# Patient Record
Sex: FEMALE | Race: WHITE | NOT HISPANIC OR LATINO | Employment: FULL TIME | URBAN - METROPOLITAN AREA
[De-identification: names, ages, dates, MRNs, and addresses within clinical notes are randomized per-mention and may not be internally consistent; named-entity substitution may affect disease eponyms.]

---

## 2023-05-11 PROBLEM — E28.2 PCOS (POLYCYSTIC OVARIAN SYNDROME): Status: ACTIVE | Noted: 2023-05-11

## 2023-05-11 PROBLEM — E78.1 HYPERTRIGLYCERIDEMIA: Status: ACTIVE | Noted: 2023-05-11

## 2023-05-11 PROBLEM — M54.31 SCIATICA OF RIGHT SIDE: Status: ACTIVE | Noted: 2023-05-11

## 2023-05-11 PROBLEM — E11.9 DIET-CONTROLLED TYPE 2 DIABETES MELLITUS (MULTI): Status: ACTIVE | Noted: 2023-05-11

## 2023-05-11 PROBLEM — R10.2 PELVIC PAIN IN FEMALE: Status: ACTIVE | Noted: 2023-05-11

## 2023-05-11 PROBLEM — R29.898 WEAKNESS OF RIGHT LOWER EXTREMITY: Status: ACTIVE | Noted: 2023-05-11

## 2023-05-11 PROBLEM — M25.551 HIP PAIN, RIGHT: Status: ACTIVE | Noted: 2023-05-11

## 2023-05-11 PROBLEM — R92.8 ABNORMAL SCREENING MAMMOGRAM: Status: ACTIVE | Noted: 2023-05-11

## 2023-05-11 PROBLEM — S76.011S: Status: ACTIVE | Noted: 2023-05-11

## 2023-05-11 PROBLEM — M67.951 TENDINOPATHY OF RIGHT GLUTEUS MEDIUS: Status: ACTIVE | Noted: 2023-05-11

## 2023-05-11 PROBLEM — M16.11 PRIMARY OSTEOARTHRITIS OF RIGHT HIP: Status: ACTIVE | Noted: 2023-05-11

## 2023-05-11 PROBLEM — K13.0 PERLECHE: Status: ACTIVE | Noted: 2023-05-11

## 2023-05-11 PROBLEM — Z98.84 H/O BARIATRIC SURGERY: Status: ACTIVE | Noted: 2023-05-11

## 2023-05-11 PROBLEM — K21.9 GERD (GASTROESOPHAGEAL REFLUX DISEASE): Status: ACTIVE | Noted: 2023-05-11

## 2023-05-11 PROBLEM — E55.9 VITAMIN D DEFICIENCY: Status: ACTIVE | Noted: 2023-05-11

## 2023-05-11 RX ORDER — ASCORBIC ACID 500 MG
TABLET,CHEWABLE ORAL
COMMUNITY
Start: 2021-07-20

## 2023-05-11 RX ORDER — LORATADINE 10 MG/1
1 TABLET ORAL DAILY
COMMUNITY
Start: 2022-01-05

## 2023-05-11 RX ORDER — MULTIVITAMIN
2 TABLET ORAL DAILY
COMMUNITY
Start: 2021-07-20

## 2023-05-11 RX ORDER — CALCIUM CARBONATE 600 MG
1 TABLET ORAL DAILY
COMMUNITY
Start: 2021-07-20

## 2023-05-11 RX ORDER — GABAPENTIN 100 MG/1
1 CAPSULE ORAL 2 TIMES DAILY
COMMUNITY
Start: 2022-09-13 | End: 2023-10-06 | Stop reason: WASHOUT

## 2023-05-11 RX ORDER — FLUTICASONE PROPIONATE 50 MCG
SPRAY, SUSPENSION (ML) NASAL
COMMUNITY
Start: 2022-01-05

## 2023-05-11 RX ORDER — ACETAMINOPHEN 500 MG
TABLET ORAL
COMMUNITY
Start: 2021-07-20

## 2023-05-11 RX ORDER — ACETAMINOPHEN, DEXTROMETHORPHAN HBR, DOXYLAMINE SUCCINATE, PHENYLEPHRINE HCL 650; 20; 12.5; 1 MG/30ML; MG/30ML; MG/30ML; MG/30ML
SOLUTION ORAL
COMMUNITY
Start: 2021-07-20

## 2023-05-11 RX ORDER — NYSTATIN AND TRIAMCINOLONE ACETONIDE 100000; 1 [USP'U]/G; MG/G
CREAM TOPICAL 2 TIMES DAILY
COMMUNITY
Start: 2021-07-20 | End: 2023-07-28

## 2023-05-11 RX ORDER — NORGESTIMATE AND ETHINYL ESTRADIOL 7DAYSX3 LO
1 KIT ORAL DAILY
COMMUNITY
Start: 2022-02-02 | End: 2023-10-06 | Stop reason: WASHOUT

## 2023-05-11 RX ORDER — OMEPRAZOLE 40 MG/1
1 CAPSULE, DELAYED RELEASE ORAL DAILY
COMMUNITY
Start: 2021-08-06 | End: 2023-07-28 | Stop reason: DRUGHIGH

## 2023-05-11 RX ORDER — FLUTICASONE PROPIONATE 55 UG/1
POWDER, METERED RESPIRATORY (INHALATION)
COMMUNITY
End: 2024-02-02 | Stop reason: WASHOUT

## 2023-05-11 RX ORDER — BACLOFEN 20 MG
1 TABLET ORAL DAILY
COMMUNITY
Start: 2021-07-20

## 2023-05-12 ENCOUNTER — OFFICE VISIT (OUTPATIENT)
Dept: PRIMARY CARE | Facility: CLINIC | Age: 48
End: 2023-05-12
Payer: COMMERCIAL

## 2023-05-12 ENCOUNTER — TELEPHONE (OUTPATIENT)
Dept: PRIMARY CARE | Facility: CLINIC | Age: 48
End: 2023-05-12

## 2023-05-12 VITALS
SYSTOLIC BLOOD PRESSURE: 122 MMHG | RESPIRATION RATE: 18 BRPM | BODY MASS INDEX: 32.28 KG/M2 | WEIGHT: 213 LBS | HEIGHT: 68 IN | TEMPERATURE: 97.2 F | DIASTOLIC BLOOD PRESSURE: 85 MMHG

## 2023-05-12 DIAGNOSIS — Z23 NEED FOR SHINGLES VACCINE: Primary | ICD-10-CM

## 2023-05-12 DIAGNOSIS — N30.01 ACUTE CYSTITIS WITH HEMATURIA: ICD-10-CM

## 2023-05-12 DIAGNOSIS — N30.01 ACUTE CYSTITIS WITH HEMATURIA: Primary | ICD-10-CM

## 2023-05-12 DIAGNOSIS — R39.15 URINARY URGENCY: ICD-10-CM

## 2023-05-12 LAB
POC APPEARANCE, URINE: CLEAR
POC BILIRUBIN, URINE: NEGATIVE
POC BLOOD, URINE: ABNORMAL
POC COLOR, URINE: ABNORMAL
POC GLUCOSE, URINE: NEGATIVE MG/DL
POC KETONES, URINE: ABNORMAL MG/DL
POC LEUKOCYTES, URINE: ABNORMAL
POC NITRITE,URINE: NEGATIVE
POC PH, URINE: 7 PH
POC PROTEIN, URINE: NEGATIVE MG/DL
POC SPECIFIC GRAVITY, URINE: <=1.005
POC UROBILINOGEN, URINE: 0.2 EU/DL

## 2023-05-12 PROCEDURE — 87086 URINE CULTURE/COLONY COUNT: CPT

## 2023-05-12 PROCEDURE — 3079F DIAST BP 80-89 MM HG: CPT | Performed by: NURSE PRACTITIONER

## 2023-05-12 PROCEDURE — 90471 IMMUNIZATION ADMIN: CPT | Performed by: NURSE PRACTITIONER

## 2023-05-12 PROCEDURE — 90750 HZV VACC RECOMBINANT IM: CPT | Performed by: NURSE PRACTITIONER

## 2023-05-12 PROCEDURE — 81002 URINALYSIS NONAUTO W/O SCOPE: CPT | Performed by: NURSE PRACTITIONER

## 2023-05-12 PROCEDURE — 3074F SYST BP LT 130 MM HG: CPT | Performed by: NURSE PRACTITIONER

## 2023-05-12 PROCEDURE — 3044F HG A1C LEVEL LT 7.0%: CPT | Performed by: NURSE PRACTITIONER

## 2023-05-12 PROCEDURE — 87186 SC STD MICRODIL/AGAR DIL: CPT

## 2023-05-12 PROCEDURE — 99213 OFFICE O/P EST LOW 20 MIN: CPT | Performed by: NURSE PRACTITIONER

## 2023-05-12 PROCEDURE — 87077 CULTURE AEROBIC IDENTIFY: CPT

## 2023-05-12 PROCEDURE — 1036F TOBACCO NON-USER: CPT | Performed by: NURSE PRACTITIONER

## 2023-05-12 RX ORDER — NITROFURANTOIN 25; 75 MG/1; MG/1
100 CAPSULE ORAL 2 TIMES DAILY
Qty: 20 CAPSULE | Refills: 0 | Status: SHIPPED | OUTPATIENT
Start: 2023-05-12 | End: 2023-05-22

## 2023-05-12 RX ORDER — SULFAMETHOXAZOLE AND TRIMETHOPRIM 800; 160 MG/1; MG/1
1 TABLET ORAL 2 TIMES DAILY
Qty: 20 TABLET | Refills: 0 | Status: SHIPPED | OUTPATIENT
Start: 2023-05-12 | End: 2023-05-22

## 2023-05-12 RX ORDER — NITROFURANTOIN 25; 75 MG/1; MG/1
100 CAPSULE ORAL DAILY
Qty: 30 CAPSULE | Refills: 5 | Status: CANCELLED | OUTPATIENT
Start: 2023-05-12 | End: 2023-11-08

## 2023-05-12 ASSESSMENT — ENCOUNTER SYMPTOMS
WEAKNESS: 0
ACTIVITY CHANGE: 0
SHORTNESS OF BREATH: 0
SPEECH DIFFICULTY: 0
CHILLS: 0
APNEA: 0
WOUND: 0
PALPITATIONS: 0
SLEEP DISTURBANCE: 0
NERVOUS/ANXIOUS: 0
DYSURIA: 0
ABDOMINAL PAIN: 0
ARTHRALGIAS: 0
VOMITING: 0
FEVER: 0
HEADACHES: 0
COUGH: 0
NAUSEA: 0
FREQUENCY: 1
DIZZINESS: 0
CONSTIPATION: 0
CONFUSION: 0
SORE THROAT: 0
DIARRHEA: 0
CONSTITUTIONAL NEGATIVE: 1
MYALGIAS: 0
HEMATURIA: 0

## 2023-05-12 NOTE — TELEPHONE ENCOUNTER
Pharmacist called in about the Macrobid (Nitrofurantonin).  The pharmacist stated that the patient is allergic to red dye and this medication and all the generic has red dye in them.  The pharmacist is requesting an alternate medication if possible.

## 2023-05-12 NOTE — TELEPHONE ENCOUNTER
Called patient and left message that the medication was changed from Macrobid to Bactrim due to the red dye in the Macrobid.

## 2023-05-12 NOTE — TELEPHONE ENCOUNTER
Discontinue Macrobid and start Bactrim twice daily for 10 days  Please give patient an update that Macrobid contains red dye per pharmacy as well as all generics for med

## 2023-05-12 NOTE — PROGRESS NOTES
"Subjective   Patient ID: Riddhi Berumen is a 48 y.o. female who presents for Urinary Frequency (patient had UTI 3 weeks ago, still having symptoms ).    Patient complains of urinary frequency and discomfort   Patient started Keflex for 7 days seen by urgent care on 4/23  , Symptoms were present 3 days prior to that  And also developed Candida orally and vaginally.  She started Activia and had resolution  Today she is complaining of continued urinary frequency and discomfort    Request for shingles vaccine     Urinary Frequency   Associated symptoms include frequency. Pertinent negatives include no chills, hematuria, nausea or vomiting.        Review of Systems   Constitutional: Negative.  Negative for activity change, chills and fever.   HENT:  Negative for congestion, postnasal drip, sneezing and sore throat.    Respiratory:  Negative for apnea, cough and shortness of breath.    Cardiovascular:  Negative for chest pain and palpitations.   Gastrointestinal:  Negative for abdominal pain, constipation, diarrhea, nausea and vomiting.   Genitourinary:  Positive for frequency. Negative for dysuria, hematuria and pelvic pain.   Musculoskeletal:  Negative for arthralgias and myalgias.   Skin:  Negative for rash and wound.   Neurological:  Negative for dizziness, syncope, speech difficulty, weakness and headaches.   Psychiatric/Behavioral:  Negative for confusion and sleep disturbance. The patient is not nervous/anxious.        Objective   /85 (BP Location: Right arm, Patient Position: Sitting)   Temp 36.2 °C (97.2 °F)   Resp 18   Ht 1.727 m (5' 8\")   Wt 96.6 kg (213 lb)   BMI 32.39 kg/m²     Physical Exam  Vitals reviewed.   Constitutional:       Appearance: Normal appearance.   HENT:      Head: Normocephalic.   Eyes:      Conjunctiva/sclera: Conjunctivae normal.   Cardiovascular:      Rate and Rhythm: Normal rate and regular rhythm.      Pulses: Normal pulses.      Heart sounds: Normal heart sounds. "   Pulmonary:      Effort: Pulmonary effort is normal.      Breath sounds: Normal breath sounds. No wheezing.   Abdominal:      General: Bowel sounds are normal. There is no distension.   Skin:     General: Skin is warm and dry.   Neurological:      General: No focal deficit present.      Mental Status: She is alert and oriented to person, place, and time.   Psychiatric:         Mood and Affect: Mood normal.         Behavior: Behavior normal.         Assessment/Plan   Problem List Items Addressed This Visit          Genitourinary    Acute cystitis with hematuria    Relevant Medications    nitrofurantoin, macrocrystal-monohydrate, (Macrobid) 100 mg capsule    Other Relevant Orders    Urine Culture       Other    Need for shingles vaccine - Primary    Relevant Orders    Zoster vaccine, recombinant, adult (SHINGRIX) (Completed)     Other Visit Diagnoses       Urinary urgency        Relevant Orders    POCT UA (nonautomated) manually resulted (Completed)    Urine Culture            Time Spent  Time spent directly with patient, family or caregiver: 20 minutes  Documentation Time: 10 minutes

## 2023-05-15 LAB
URINE CULTURE: ABNORMAL
URINE CULTURE: ABNORMAL

## 2023-05-18 ENCOUNTER — TELEPHONE (OUTPATIENT)
Dept: PRIMARY CARE | Facility: CLINIC | Age: 48
End: 2023-05-18

## 2023-05-18 NOTE — TELEPHONE ENCOUNTER
Patient called in and would like to know are you willing to take her daughter on as a new patient?

## 2023-07-24 LAB
ALANINE AMINOTRANSFERASE (SGPT) (U/L) IN SER/PLAS: 15 U/L (ref 7–45)
ALBUMIN (G/DL) IN SER/PLAS: 3.7 G/DL (ref 3.4–5)
ALKALINE PHOSPHATASE (U/L) IN SER/PLAS: 66 U/L (ref 33–110)
ANION GAP IN SER/PLAS: 12 MMOL/L (ref 10–20)
ASPARTATE AMINOTRANSFERASE (SGOT) (U/L) IN SER/PLAS: 21 U/L (ref 9–39)
BILIRUBIN TOTAL (MG/DL) IN SER/PLAS: 0.3 MG/DL (ref 0–1.2)
CALCIDIOL (25 OH VITAMIN D3) (NG/ML) IN SER/PLAS: 60 NG/ML
CALCIUM (MG/DL) IN SER/PLAS: 9 MG/DL (ref 8.6–10.3)
CARBON DIOXIDE, TOTAL (MMOL/L) IN SER/PLAS: 27 MMOL/L (ref 21–32)
CHLORIDE (MMOL/L) IN SER/PLAS: 102 MMOL/L (ref 98–107)
CHOLESTEROL (MG/DL) IN SER/PLAS: 201 MG/DL (ref 0–199)
CHOLESTEROL IN HDL (MG/DL) IN SER/PLAS: 81.5 MG/DL
CHOLESTEROL IN LDL (MG/DL) IN SER/PLAS BY DIRECT ASSAY: 109 MG/DL (ref 0–129)
CHOLESTEROL/HDL RATIO: 2.5
CREATININE (MG/DL) IN SER/PLAS: 0.71 MG/DL (ref 0.5–1.05)
ESTIMATED AVERAGE GLUCOSE FOR HBA1C: 126 MG/DL
GFR FEMALE: >90 ML/MIN/1.73M2
GLUCOSE (MG/DL) IN SER/PLAS: 82 MG/DL (ref 74–99)
HEMOGLOBIN A1C/HEMOGLOBIN TOTAL IN BLOOD: 6 %
LDL: 80 MG/DL (ref 0–99)
POTASSIUM (MMOL/L) IN SER/PLAS: 4.1 MMOL/L (ref 3.5–5.3)
PROTEIN TOTAL: 6.8 G/DL (ref 6.4–8.2)
SODIUM (MMOL/L) IN SER/PLAS: 137 MMOL/L (ref 136–145)
TRIGLYCERIDE (MG/DL) IN SER/PLAS: 198 MG/DL (ref 0–149)
UREA NITROGEN (MG/DL) IN SER/PLAS: 13 MG/DL (ref 6–23)
VLDL: 40 MG/DL (ref 0–40)

## 2023-07-25 PROBLEM — M26.609 TEMPOROMANDIBULAR JOINT DISORDER: Status: ACTIVE | Noted: 2023-07-25

## 2023-07-25 PROBLEM — S76.011S: Status: RESOLVED | Noted: 2023-05-11 | Resolved: 2023-07-25

## 2023-07-25 PROBLEM — E60 ZINC DEFICIENCY: Status: ACTIVE | Noted: 2017-05-19

## 2023-07-25 PROBLEM — Z23 NEED FOR SHINGLES VACCINE: Status: RESOLVED | Noted: 2023-05-12 | Resolved: 2023-07-25

## 2023-07-25 PROBLEM — H90.3 SENSORINEURAL HEARING LOSS, BILATERAL: Status: ACTIVE | Noted: 2021-11-22

## 2023-07-25 PROBLEM — M25.551 HIP PAIN, RIGHT: Status: RESOLVED | Noted: 2023-05-11 | Resolved: 2023-07-25

## 2023-07-25 PROBLEM — R92.8 ABNORMAL SCREENING MAMMOGRAM: Status: RESOLVED | Noted: 2023-05-11 | Resolved: 2023-07-25

## 2023-07-25 PROBLEM — Z00.00 ANNUAL PHYSICAL EXAM: Status: ACTIVE | Noted: 2023-07-25

## 2023-07-25 PROBLEM — J30.2 SEASONAL ALLERGIC RHINITIS: Status: ACTIVE | Noted: 2022-01-05

## 2023-07-25 PROBLEM — K13.0 PERLECHE: Status: RESOLVED | Noted: 2023-05-11 | Resolved: 2023-07-25

## 2023-07-25 PROBLEM — N30.01 ACUTE CYSTITIS WITH HEMATURIA: Status: RESOLVED | Noted: 2023-05-12 | Resolved: 2023-07-25

## 2023-07-25 PROBLEM — R29.898 WEAKNESS OF RIGHT LOWER EXTREMITY: Status: RESOLVED | Noted: 2023-05-11 | Resolved: 2023-07-25

## 2023-07-25 PROBLEM — H90.2 CONDUCTIVE HEARING LOSS: Status: ACTIVE | Noted: 2023-07-25

## 2023-07-25 RX ORDER — AZELASTINE 1 MG/ML
SPRAY, METERED NASAL
COMMUNITY

## 2023-07-25 RX ORDER — LISINOPRIL AND HYDROCHLOROTHIAZIDE 10; 12.5 MG/1; MG/1
1 TABLET ORAL DAILY
COMMUNITY
End: 2024-02-02 | Stop reason: WASHOUT

## 2023-07-25 RX ORDER — METFORMIN HYDROCHLORIDE 500 MG/1
500 TABLET ORAL
COMMUNITY
End: 2023-10-06 | Stop reason: WASHOUT

## 2023-07-25 RX ORDER — MOMETASONE FUROATE 50 UG/1
SPRAY, METERED NASAL
COMMUNITY
End: 2024-02-02 | Stop reason: WASHOUT

## 2023-07-25 RX ORDER — ASPIRIN 81 MG/1
81 TABLET ORAL DAILY
COMMUNITY
End: 2023-10-06 | Stop reason: WASHOUT

## 2023-07-25 RX ORDER — DESOGESTREL AND ETHINYL ESTRADIOL 21-5 (28)
KIT ORAL EVERY 24 HOURS
COMMUNITY
End: 2024-02-02 | Stop reason: WASHOUT

## 2023-07-25 RX ORDER — MELOXICAM 7.5 MG/1
TABLET ORAL
COMMUNITY
Start: 2014-08-14 | End: 2024-02-02 | Stop reason: WASHOUT

## 2023-07-27 PROBLEM — E66.811 CLASS 1 OBESITY DUE TO EXCESS CALORIES WITH SERIOUS COMORBIDITY AND BODY MASS INDEX (BMI) OF 32.0 TO 32.9 IN ADULT: Status: ACTIVE | Noted: 2023-07-27

## 2023-07-27 PROBLEM — E66.09 CLASS 1 OBESITY DUE TO EXCESS CALORIES WITH SERIOUS COMORBIDITY AND BODY MASS INDEX (BMI) OF 32.0 TO 32.9 IN ADULT: Status: ACTIVE | Noted: 2023-07-27

## 2023-07-27 NOTE — ASSESSMENT & PLAN NOTE
Yearly physical done.  PAP 7/22  Has received both initial COVID vaccines and at least one booster. Bivalent vaccine recommended.  Shingrix #2 given Risks, benefits and side effects reviewed with patient.   Boostrix given Risks, benefits and side effects reviewed with patient.   Colonoscopy ordered  Mammogram ordered per gyn - due 9/23 per patient  Nonsmoker

## 2023-07-27 NOTE — PROGRESS NOTES
tetSubjective   Patient ID: Riddhi Berumen is a 48 y.o. female who presents for Annual Exam.    HPI  Patient presents today for Annual Physical.  Ongoing cracking at corners of lips despite nystatin cream.  Would like to try decreasing dose PPI.  Patient otherwise feels well. No other complaints or concerns.    The patient's relevant past medical, surgical, family, and social history was reviewed in [facility].  All pertinent lab work and results for this visit were reviewed with patient.    Recent Results (from the past 1008 hour(s))   Cholesterol, LDL Direct    Collection Time: 07/24/23  7:40 AM   Result Value Ref Range    LDL Direct 109 0 - 129 mg/dL   Lipid Panel    Collection Time: 07/24/23  7:40 AM   Result Value Ref Range    Cholesterol 201 (H) 0 - 199 mg/dL    HDL 81.5 mg/dL    Cholesterol/HDL Ratio 2.5     LDL 80 0 - 99 mg/dL    VLDL 40 0 - 40 mg/dL    Triglycerides 198 (H) 0 - 149 mg/dL   Vitamin D, Total    Collection Time: 07/24/23  7:40 AM   Result Value Ref Range    Vitamin D, 25-Hydroxy 60 ng/mL   Hemoglobin A1C    Collection Time: 07/24/23  7:40 AM   Result Value Ref Range    Hemoglobin A1C 6.0 (A) %    Estimated Average Glucose 126 MG/DL   Comprehensive Metabolic Panel    Collection Time: 07/24/23  7:40 AM   Result Value Ref Range    Glucose 82 74 - 99 mg/dL    Sodium 137 136 - 145 mmol/L    Potassium 4.1 3.5 - 5.3 mmol/L    Chloride 102 98 - 107 mmol/L    Bicarbonate 27 21 - 32 mmol/L    Anion Gap 12 10 - 20 mmol/L    Urea Nitrogen 13 6 - 23 mg/dL    Creatinine 0.71 0.50 - 1.05 mg/dL    GFR Female >90 >90 mL/min/1.73m2    Calcium 9.0 8.6 - 10.3 mg/dL    Albumin 3.7 3.4 - 5.0 g/dL    Alkaline Phosphatase 66 33 - 110 U/L    Total Protein 6.8 6.4 - 8.2 g/dL    AST 21 9 - 39 U/L    Total Bilirubin 0.3 0.0 - 1.2 mg/dL    ALT (SGPT) 15 7 - 45 U/L           Review of Systems   A complete review of systems was performed and all systems were normal except what is noted in the HPI.        Objective   /87    "Pulse 88   Temp 36.3 °C (97.3 °F)   Ht 1.727 m (5' 8\")   Wt 98.5 kg (217 lb 3.2 oz)   SpO2 98%   BMI 33.03 kg/m²    Physical Exam  Constitutional:       General: She is not in acute distress.     Appearance: Normal appearance. She is obese.   HENT:      Head: Normocephalic and atraumatic.   Cardiovascular:      Rate and Rhythm: Normal rate and regular rhythm.      Heart sounds: Normal heart sounds.   Pulmonary:      Effort: Pulmonary effort is normal.      Breath sounds: Normal breath sounds.   Abdominal:      General: Abdomen is flat. Bowel sounds are normal.      Palpations: Abdomen is soft.      Tenderness: There is no abdominal tenderness.   Musculoskeletal:      Right lower leg: No edema.      Left lower leg: No edema.   Neurological:      Mental Status: She is alert.   Psychiatric:         Mood and Affect: Mood normal.         Behavior: Behavior normal.         Thought Content: Thought content normal.         Health Maintenance Due   Topic Date Due    Yearly Adult Physical  Never done    Hepatitis B Vaccines (1 of 3 - 3-dose series) Never done    HIV Screening  Never done    Colorectal Cancer Screening  Never done    Pneumococcal Vaccine: Pediatrics (0 to 5 Years) and At-Risk Patients (6 to 64 Years) (1 - PCV) Never done    Diabetes: Foot Exam  Never done    Diabetes: Retinopathy Screening  Never done    Hepatitis C Screening  Never done    Cervical Cancer Screening  Never done    DTaP/Tdap/Td Vaccines (1 - Tdap) Never done    MMR Vaccines (1 of 1 - Standard series) Never done    COVID-19 Vaccine (4 - Booster for Pfizer series) 01/02/2023    Mammogram  03/08/2023    Zoster Vaccines (2 of 2) 07/07/2023        Assessment/Plan   Problem List Items Addressed This Visit       IFG (impaired fasting glucose)     Stable  continue work on diet  Reevaluate in 6 months.           GERD (gastroesophageal reflux disease)     Trial decrease omeprazole to 20 mg  Call if breakthrough symptoms          Relevant Medications "    omeprazole (PriLOSEC) 20 mg DR capsule    H/O bariatric surgery    Relevant Orders    TSH with reflex to Free T4 if abnormal    Ferritin    Folate    Iron and TIBC    Magnesium    Vitamin B12    Vitamin B6    Zinc, Serum or Plasma    Hypertriglyceridemia     Up some  Work on diet reviewed with patient.   Reevaluate in 6 months.           Relevant Orders    TSH with reflex to Free T4 if abnormal    Lipid Panel    Cholesterol, LDL Direct    Perleche     Stop nystatin/triamcinolone cream  Trial vytone  Avoid licking lips as able  call if worsens or persists          Relevant Medications    hydrocortisone-iodoquinoL-aloe 1.9-1 % cream in packet    Vitamin D deficiency     Well controlled continue current medication. Reevaluate in 6 months.            Relevant Orders    Vitamin D 1,25 Dihydroxy    Essential hypertension     Well controlled continue current medication. Reevaluate in 6 months.            Relevant Orders    TSH with reflex to Free T4 if abnormal    CBC    Comprehensive Metabolic Panel    Zinc deficiency    Relevant Orders    Zinc, Serum or Plasma    Annual physical exam - Primary     Yearly physical done.  PAP 7/22  Has received both initial COVID vaccines and at least one booster. Bivalent vaccine recommended.  Shingrix #2 given Risks, benefits and side effects reviewed with patient.   Boostrix given Risks, benefits and side effects reviewed with patient.   Colonoscopy ordered  Mammogram ordered per gyn - due 9/23 per patient  Nonsmoker         Class 1 obesity due to excess calories with serious comorbidity and body mass index (BMI) of 33.0 to 33.9 in adult     Work on diet reviewed with patient.   Recommend at least 150 minutes of cardiovascular exercise weekly.           Other Visit Diagnoses       Encounter for hepatitis C screening test for low risk patient        Relevant Orders    Hepatitis C Antibody    Encounter for screening for malignant neoplasm of colon        Relevant Orders    Colonoscopy               Patient understands and agrees with care plan.           Enma Agee MD

## 2023-07-27 NOTE — PATIENT INSTRUCTIONS
I would like you to follow up in 6 months  Please have all labs that were ordered done at least 1 week prior to your visit.    Recommend healthy diet based around fruits, vegetables, and lean proteins such as chicken, turkey, fish, and beans.  Also include moderate portions of healthy carbohydrates such as wheat bread and pasta, sweet potatoes. Limit sweets and alcoholic beverages. Try not drink more than 100 calories in beverages daily.   It is important to get a protein-rich breakfast daily such as oatmeal, eggs or Greek yogurt.  Increase activity as able to a recommended goal of at least 30 minutes of cardiovascular exercise (walking, swimming, biking, jogging etc.) at least 5 days weekly and a goal of 45 minutes or more most days of the week for weight loss. This exercise can be done all at one time or broken up into 2 or more sessions throughout the day.

## 2023-07-28 ENCOUNTER — OFFICE VISIT (OUTPATIENT)
Dept: PRIMARY CARE | Facility: CLINIC | Age: 48
End: 2023-07-28
Payer: COMMERCIAL

## 2023-07-28 ENCOUNTER — TELEPHONE (OUTPATIENT)
Dept: PRIMARY CARE | Facility: CLINIC | Age: 48
End: 2023-07-28

## 2023-07-28 VITALS
DIASTOLIC BLOOD PRESSURE: 87 MMHG | SYSTOLIC BLOOD PRESSURE: 130 MMHG | HEART RATE: 88 BPM | TEMPERATURE: 97.3 F | WEIGHT: 217.2 LBS | OXYGEN SATURATION: 98 % | HEIGHT: 68 IN | BODY MASS INDEX: 32.92 KG/M2

## 2023-07-28 DIAGNOSIS — K21.9 GASTROESOPHAGEAL REFLUX DISEASE WITHOUT ESOPHAGITIS: ICD-10-CM

## 2023-07-28 DIAGNOSIS — R73.01 IFG (IMPAIRED FASTING GLUCOSE): ICD-10-CM

## 2023-07-28 DIAGNOSIS — Z98.84 H/O BARIATRIC SURGERY: ICD-10-CM

## 2023-07-28 DIAGNOSIS — E60 ZINC DEFICIENCY: ICD-10-CM

## 2023-07-28 DIAGNOSIS — K13.0 PERLECHE: ICD-10-CM

## 2023-07-28 DIAGNOSIS — E66.09 CLASS 1 OBESITY DUE TO EXCESS CALORIES WITH SERIOUS COMORBIDITY AND BODY MASS INDEX (BMI) OF 33.0 TO 33.9 IN ADULT: ICD-10-CM

## 2023-07-28 DIAGNOSIS — E55.9 VITAMIN D DEFICIENCY: ICD-10-CM

## 2023-07-28 DIAGNOSIS — I10 ESSENTIAL HYPERTENSION: ICD-10-CM

## 2023-07-28 DIAGNOSIS — Z12.11 ENCOUNTER FOR SCREENING FOR MALIGNANT NEOPLASM OF COLON: ICD-10-CM

## 2023-07-28 DIAGNOSIS — E78.1 HYPERTRIGLYCERIDEMIA: ICD-10-CM

## 2023-07-28 DIAGNOSIS — Z00.00 ANNUAL PHYSICAL EXAM: Primary | ICD-10-CM

## 2023-07-28 DIAGNOSIS — Z11.59 ENCOUNTER FOR HEPATITIS C SCREENING TEST FOR LOW RISK PATIENT: ICD-10-CM

## 2023-07-28 DIAGNOSIS — E11.9 DIET-CONTROLLED TYPE 2 DIABETES MELLITUS (MULTI): ICD-10-CM

## 2023-07-28 PROCEDURE — 90472 IMMUNIZATION ADMIN EACH ADD: CPT | Performed by: FAMILY MEDICINE

## 2023-07-28 PROCEDURE — 99214 OFFICE O/P EST MOD 30 MIN: CPT | Performed by: FAMILY MEDICINE

## 2023-07-28 PROCEDURE — 90750 HZV VACC RECOMBINANT IM: CPT | Performed by: FAMILY MEDICINE

## 2023-07-28 PROCEDURE — 3044F HG A1C LEVEL LT 7.0%: CPT | Performed by: FAMILY MEDICINE

## 2023-07-28 PROCEDURE — 1036F TOBACCO NON-USER: CPT | Performed by: FAMILY MEDICINE

## 2023-07-28 PROCEDURE — 90471 IMMUNIZATION ADMIN: CPT | Performed by: FAMILY MEDICINE

## 2023-07-28 PROCEDURE — 90715 TDAP VACCINE 7 YRS/> IM: CPT | Performed by: FAMILY MEDICINE

## 2023-07-28 PROCEDURE — 3075F SYST BP GE 130 - 139MM HG: CPT | Performed by: FAMILY MEDICINE

## 2023-07-28 PROCEDURE — 3079F DIAST BP 80-89 MM HG: CPT | Performed by: FAMILY MEDICINE

## 2023-07-28 PROCEDURE — 99396 PREV VISIT EST AGE 40-64: CPT | Performed by: FAMILY MEDICINE

## 2023-07-28 PROCEDURE — 3008F BODY MASS INDEX DOCD: CPT | Performed by: FAMILY MEDICINE

## 2023-07-28 RX ORDER — HYDROCORTISONE, IODOQUINOL 10; 10 MG/G; MG/G
CREAM TOPICAL
Qty: 28.4 G | Refills: 2 | Status: SHIPPED | OUTPATIENT
Start: 2023-07-28 | End: 2024-02-02 | Stop reason: WASHOUT

## 2023-07-28 RX ORDER — HYDROCORTISONE ACETATE, IODOQUINOL 19; 10 MG/G; MG/G
CREAM TOPICAL
Qty: 15 G | Refills: 3 | Status: SHIPPED | OUTPATIENT
Start: 2023-07-28

## 2023-07-28 RX ORDER — OMEPRAZOLE 20 MG/1
20 CAPSULE, DELAYED RELEASE ORAL DAILY
Qty: 30 CAPSULE | Refills: 11 | Status: SHIPPED | OUTPATIENT
Start: 2023-07-28 | End: 2024-07-27

## 2023-07-28 ASSESSMENT — PATIENT HEALTH QUESTIONNAIRE - PHQ9
SUM OF ALL RESPONSES TO PHQ9 QUESTIONS 1 AND 2: 0
1. LITTLE INTEREST OR PLEASURE IN DOING THINGS: NOT AT ALL
2. FEELING DOWN, DEPRESSED OR HOPELESS: NOT AT ALL

## 2023-07-28 NOTE — ASSESSMENT & PLAN NOTE
Stop nystatin/triamcinolone cream  Trial vytone  Avoid licking lips as able  call if worsens or persists

## 2023-07-28 NOTE — LETTER
July 28, 2023     Patient: Riddhi Berumen   YOB: 1975   Date of Visit: 7/28/2023       To Whom It May Concern:    Riddhi Berumen was seen in my clinic on 7/28/2023 at 10:20 am for a physical examination.  If you have any questions or concerns, please don't hesitate to call.         Sincerely,         Enma Agee MD        CC: No Recipients

## 2023-07-28 NOTE — TELEPHONE ENCOUNTER
hydrocortisone-iodoquinoL-aloe 1.9-1 % cream in packet Is not covered by  her insurance. Please order the Hydrocortisone !5 and the Iodoquil 1% which is covered by her isnsurance

## 2023-09-18 ENCOUNTER — HOSPITAL ENCOUNTER (OUTPATIENT)
Dept: DATA CONVERSION | Facility: HOSPITAL | Age: 48
End: 2023-09-18
Attending: INTERNAL MEDICINE | Admitting: INTERNAL MEDICINE
Payer: COMMERCIAL

## 2023-09-18 DIAGNOSIS — M19.90 UNSPECIFIED OSTEOARTHRITIS, UNSPECIFIED SITE: ICD-10-CM

## 2023-09-18 DIAGNOSIS — Z80.0 FAMILY HISTORY OF MALIGNANT NEOPLASM OF DIGESTIVE ORGANS: ICD-10-CM

## 2023-09-18 DIAGNOSIS — K21.9 GASTRO-ESOPHAGEAL REFLUX DISEASE WITHOUT ESOPHAGITIS: ICD-10-CM

## 2023-09-18 DIAGNOSIS — Z12.11 ENCOUNTER FOR SCREENING FOR MALIGNANT NEOPLASM OF COLON: ICD-10-CM

## 2023-09-18 DIAGNOSIS — E11.9 TYPE 2 DIABETES MELLITUS WITHOUT COMPLICATIONS (MULTI): ICD-10-CM

## 2023-09-18 DIAGNOSIS — E78.5 HYPERLIPIDEMIA, UNSPECIFIED: ICD-10-CM

## 2023-09-18 DIAGNOSIS — E28.2 POLYCYSTIC OVARIAN SYNDROME: ICD-10-CM

## 2023-09-21 LAB — HCG, URINE: NEGATIVE

## 2023-09-29 VITALS — BODY MASS INDEX: 32.58 KG/M2 | HEIGHT: 68 IN | WEIGHT: 214.95 LBS

## 2023-10-06 ENCOUNTER — OFFICE VISIT (OUTPATIENT)
Dept: OBSTETRICS AND GYNECOLOGY | Facility: CLINIC | Age: 48
End: 2023-10-06
Payer: COMMERCIAL

## 2023-10-06 ENCOUNTER — ANCILLARY PROCEDURE (OUTPATIENT)
Dept: RADIOLOGY | Facility: CLINIC | Age: 48
End: 2023-10-06
Payer: COMMERCIAL

## 2023-10-06 VITALS
SYSTOLIC BLOOD PRESSURE: 138 MMHG | BODY MASS INDEX: 30.95 KG/M2 | HEIGHT: 70 IN | DIASTOLIC BLOOD PRESSURE: 70 MMHG | WEIGHT: 216.2 LBS

## 2023-10-06 VITALS — BODY MASS INDEX: 32.58 KG/M2 | WEIGHT: 215 LBS | HEIGHT: 68 IN

## 2023-10-06 DIAGNOSIS — Z12.31 ENCOUNTER FOR SCREENING MAMMOGRAM FOR MALIGNANT NEOPLASM OF BREAST: ICD-10-CM

## 2023-10-06 DIAGNOSIS — Z12.31 ENCOUNTER FOR SCREENING MAMMOGRAM FOR MALIGNANT NEOPLASM OF BREAST: Primary | ICD-10-CM

## 2023-10-06 PROCEDURE — 77063 BREAST TOMOSYNTHESIS BI: CPT | Mod: BILATERAL PROCEDURE | Performed by: RADIOLOGY

## 2023-10-06 PROCEDURE — 1036F TOBACCO NON-USER: CPT | Performed by: OBSTETRICS & GYNECOLOGY

## 2023-10-06 PROCEDURE — 3078F DIAST BP <80 MM HG: CPT | Performed by: OBSTETRICS & GYNECOLOGY

## 2023-10-06 PROCEDURE — 3075F SYST BP GE 130 - 139MM HG: CPT | Performed by: OBSTETRICS & GYNECOLOGY

## 2023-10-06 PROCEDURE — 3008F BODY MASS INDEX DOCD: CPT | Performed by: OBSTETRICS & GYNECOLOGY

## 2023-10-06 PROCEDURE — 77067 SCR MAMMO BI INCL CAD: CPT | Mod: BILATERAL PROCEDURE | Performed by: RADIOLOGY

## 2023-10-06 PROCEDURE — 77067 SCR MAMMO BI INCL CAD: CPT | Mod: 50

## 2023-10-06 PROCEDURE — 99396 PREV VISIT EST AGE 40-64: CPT | Performed by: OBSTETRICS & GYNECOLOGY

## 2023-10-06 NOTE — PROGRESS NOTES
Subjective   Patient ID: Riddhi Berumen is a 48 y.o. female who presents for No chief complaint on file..  48-year-old G1, P1 on oral contraceptive presents for yearly exam.  She has experienced 2 periods a month for the last 2 months.  She denies any hot flashes or night sweats.  She denies any pelvic pain.        Review of Systems   All other systems reviewed and are negative.      Objective   Physical Exam  Constitutional:       Appearance: Normal appearance.   HENT:      Head: Normocephalic.   Cardiovascular:      Rate and Rhythm: Normal rate and regular rhythm.   Pulmonary:      Effort: Pulmonary effort is normal.      Breath sounds: Normal breath sounds.   Genitourinary:     General: Normal vulva.      Vagina: Normal.      Cervix: Normal.      Uterus: Normal.       Adnexa: Right adnexa normal and left adnexa normal.   Musculoskeletal:      Cervical back: Normal range of motion and neck supple.   Neurological:      Mental Status: She is alert.         Assessment/Plan   Yearly exam no Pap smear was done we would wait until 20 2025 to repeat her Pap smear.  Mammogram was ordered today.  Her colon cancer screening is pending she had incomplete exam done last month.  Follow-up is in 1 year.  I encouraged her to do self breast exam monthly and regular walking or exercise.

## 2023-10-13 ENCOUNTER — DOCUMENTATION (OUTPATIENT)
Dept: GASTROENTEROLOGY | Facility: CLINIC | Age: 48
End: 2023-10-13
Payer: COMMERCIAL

## 2023-10-13 NOTE — PROGRESS NOTES
Patient does not want to schedule her repeat colonoscopy at this time. She states she will call to schedule in February 2024.

## 2023-12-21 ENCOUNTER — TELEPHONE (OUTPATIENT)
Dept: PRIMARY CARE | Facility: CLINIC | Age: 48
End: 2023-12-21
Payer: COMMERCIAL

## 2023-12-21 DIAGNOSIS — U07.1 COVID: Primary | ICD-10-CM

## 2023-12-21 RX ORDER — BENZONATATE 100 MG/1
100 CAPSULE ORAL 3 TIMES DAILY PRN
Qty: 42 CAPSULE | Refills: 0 | Status: SHIPPED | OUTPATIENT
Start: 2023-12-21 | End: 2024-01-20

## 2023-12-21 RX ORDER — NIRMATRELVIR AND RITONAVIR 300-100 MG
3 KIT ORAL 2 TIMES DAILY
Qty: 30 TABLET | Refills: 0 | Status: SHIPPED | OUTPATIENT
Start: 2023-12-21 | End: 2023-12-26

## 2023-12-21 NOTE — TELEPHONE ENCOUNTER
Patient tested positive for covid last night . Symptoms started on Tuesday evening the 19th     Patient asking for Paxlovid and something for symptom control for cough and congestion.      Please advise

## 2024-01-15 ENCOUNTER — APPOINTMENT (OUTPATIENT)
Dept: LAB | Facility: LAB | Age: 49
End: 2024-01-15
Payer: COMMERCIAL

## 2024-01-15 ENCOUNTER — LAB (OUTPATIENT)
Dept: LAB | Facility: LAB | Age: 49
End: 2024-01-15
Payer: COMMERCIAL

## 2024-01-15 DIAGNOSIS — E55.9 VITAMIN D DEFICIENCY: ICD-10-CM

## 2024-01-15 DIAGNOSIS — E78.1 HYPERTRIGLYCERIDEMIA: ICD-10-CM

## 2024-01-15 DIAGNOSIS — E60 ZINC DEFICIENCY: ICD-10-CM

## 2024-01-15 DIAGNOSIS — I10 ESSENTIAL HYPERTENSION: ICD-10-CM

## 2024-01-15 DIAGNOSIS — Z11.59 ENCOUNTER FOR HEPATITIS C SCREENING TEST FOR LOW RISK PATIENT: ICD-10-CM

## 2024-01-15 DIAGNOSIS — E11.9 DIET-CONTROLLED TYPE 2 DIABETES MELLITUS (MULTI): ICD-10-CM

## 2024-01-15 DIAGNOSIS — E61.9 DEFICIENCY OF NUTRIENT ELEMENT, UNSPECIFIED: Primary | ICD-10-CM

## 2024-01-15 DIAGNOSIS — Z98.84 H/O BARIATRIC SURGERY: ICD-10-CM

## 2024-01-15 LAB
25(OH)D3 SERPL-MCNC: 53 NG/ML (ref 30–100)
ALBUMIN SERPL BCP-MCNC: 3.8 G/DL (ref 3.4–5)
ALP SERPL-CCNC: 91 U/L (ref 33–110)
ALT SERPL W P-5'-P-CCNC: 42 U/L (ref 7–45)
ANION GAP SERPL CALC-SCNC: 10 MMOL/L (ref 10–20)
AST SERPL W P-5'-P-CCNC: 31 U/L (ref 9–39)
BILIRUB SERPL-MCNC: 0.4 MG/DL (ref 0–1.2)
BUN SERPL-MCNC: 16 MG/DL (ref 6–23)
CALCIUM SERPL-MCNC: 9.1 MG/DL (ref 8.6–10.3)
CHLORIDE SERPL-SCNC: 105 MMOL/L (ref 98–107)
CHOLEST SERPL-MCNC: 188 MG/DL (ref 0–199)
CHOLESTEROL/HDL RATIO: 2.8
CO2 SERPL-SCNC: 29 MMOL/L (ref 21–32)
CREAT SERPL-MCNC: 0.71 MG/DL (ref 0.5–1.05)
CREAT UR-MCNC: 120 MG/DL (ref 20–320)
EGFRCR SERPLBLD CKD-EPI 2021: >90 ML/MIN/1.73M*2
ERYTHROCYTE [DISTWIDTH] IN BLOOD BY AUTOMATED COUNT: 13.3 % (ref 11.5–14.5)
EST. AVERAGE GLUCOSE BLD GHB EST-MCNC: 126 MG/DL
FERRITIN SERPL-MCNC: 32 NG/ML (ref 8–150)
FOLATE SERPL-MCNC: >22.3 NG/ML
GLUCOSE SERPL-MCNC: 94 MG/DL (ref 74–99)
HBA1C MFR BLD: 6 %
HCT VFR BLD AUTO: 40.8 % (ref 36–46)
HCV AB SER QL: NONREACTIVE
HDLC SERPL-MCNC: 66.7 MG/DL
HGB BLD-MCNC: 12.9 G/DL (ref 12–16)
IRON SATN MFR SERPL: 26 % (ref 25–45)
IRON SERPL-MCNC: 95 UG/DL (ref 35–150)
LDLC SERPL CALC-MCNC: 95 MG/DL
LDLC SERPL DIRECT ASSAY-MCNC: 106 MG/DL (ref 0–129)
MAGNESIUM SERPL-MCNC: 1.84 MG/DL (ref 1.6–2.4)
MCH RBC QN AUTO: 29.1 PG (ref 26–34)
MCHC RBC AUTO-ENTMCNC: 31.6 G/DL (ref 32–36)
MCV RBC AUTO: 92 FL (ref 80–100)
MICROALBUMIN UR-MCNC: 26.5 MG/L
MICROALBUMIN/CREAT UR: 22.1 UG/MG CREAT
NON HDL CHOLESTEROL: 121 MG/DL (ref 0–149)
NRBC BLD-RTO: 0 /100 WBCS (ref 0–0)
PLATELET # BLD AUTO: 392 X10*3/UL (ref 150–450)
POTASSIUM SERPL-SCNC: 3.9 MMOL/L (ref 3.5–5.3)
PROT SERPL-MCNC: 6.5 G/DL (ref 6.4–8.2)
RBC # BLD AUTO: 4.43 X10*6/UL (ref 4–5.2)
SODIUM SERPL-SCNC: 140 MMOL/L (ref 136–145)
TIBC SERPL-MCNC: 365 UG/DL (ref 240–445)
TRIGL SERPL-MCNC: 130 MG/DL (ref 0–149)
TSH SERPL-ACNC: 0.49 MIU/L (ref 0.44–3.98)
UIBC SERPL-MCNC: 270 UG/DL (ref 110–370)
VIT B12 SERPL-MCNC: 2014 PG/ML (ref 211–911)
VLDL: 26 MG/DL (ref 0–40)
WBC # BLD AUTO: 7.7 X10*3/UL (ref 4.4–11.3)

## 2024-01-15 PROCEDURE — 83735 ASSAY OF MAGNESIUM: CPT

## 2024-01-15 PROCEDURE — 82746 ASSAY OF FOLIC ACID SERUM: CPT

## 2024-01-15 PROCEDURE — 83036 HEMOGLOBIN GLYCOSYLATED A1C: CPT

## 2024-01-15 PROCEDURE — 82652 VIT D 1 25-DIHYDROXY: CPT

## 2024-01-15 PROCEDURE — 83540 ASSAY OF IRON: CPT

## 2024-01-15 PROCEDURE — 80061 LIPID PANEL: CPT

## 2024-01-15 PROCEDURE — 85027 COMPLETE CBC AUTOMATED: CPT

## 2024-01-15 PROCEDURE — 82570 ASSAY OF URINE CREATININE: CPT

## 2024-01-15 PROCEDURE — 84207 ASSAY OF VITAMIN B-6: CPT

## 2024-01-15 PROCEDURE — 36415 COLL VENOUS BLD VENIPUNCTURE: CPT

## 2024-01-15 PROCEDURE — 84425 ASSAY OF VITAMIN B-1: CPT

## 2024-01-15 PROCEDURE — 82306 VITAMIN D 25 HYDROXY: CPT

## 2024-01-15 PROCEDURE — 82043 UR ALBUMIN QUANTITATIVE: CPT

## 2024-01-15 PROCEDURE — 82607 VITAMIN B-12: CPT

## 2024-01-15 PROCEDURE — 83721 ASSAY OF BLOOD LIPOPROTEIN: CPT

## 2024-01-15 PROCEDURE — 84630 ASSAY OF ZINC: CPT

## 2024-01-15 PROCEDURE — 82728 ASSAY OF FERRITIN: CPT

## 2024-01-15 PROCEDURE — 86803 HEPATITIS C AB TEST: CPT

## 2024-01-15 PROCEDURE — 83550 IRON BINDING TEST: CPT

## 2024-01-15 PROCEDURE — 80053 COMPREHEN METABOLIC PANEL: CPT

## 2024-01-15 PROCEDURE — 84443 ASSAY THYROID STIM HORMONE: CPT

## 2024-01-17 LAB — 1,25(OH)2D3 SERPL-MCNC: 57.9 PG/ML (ref 19.9–79.3)

## 2024-01-18 LAB
PYRIDOXAL PHOS SERPL-SCNC: 96.8 NMOL/L (ref 20–125)
ZINC SERPL-MCNC: 68.6 UG/DL (ref 60–120)

## 2024-01-19 LAB — VIT B1 PYROPHOSHATE BLD-SCNC: 186 NMOL/L (ref 70–180)

## 2024-01-29 PROBLEM — E51.9 THIAMINE DEFICIENCY: Status: ACTIVE | Noted: 2024-01-29

## 2024-01-29 PROBLEM — K13.0 PERLECHE: Status: RESOLVED | Noted: 2023-05-11 | Resolved: 2024-01-29

## 2024-01-29 NOTE — PROGRESS NOTES
Subjective   Patient ID: Riddhi Berumen is a 48 y.o. female who presents for Follow-up.    HPI  Patient presents today for follow up labs and chronic conditions.  Patient feels well. No other complaints or concerns.    The patient's relevant past medical, surgical, family, and social history was reviewed in [facility].  All pertinent lab work and results for this visit were reviewed with patient.    Lab on 01/15/2024   Component Date Value Ref Range Status    Thyroid Stimulating Hormone 01/15/2024 0.49  0.44 - 3.98 mIU/L Final    WBC 01/15/2024 7.7  4.4 - 11.3 x10*3/uL Final    nRBC 01/15/2024 0.0  0.0 - 0.0 /100 WBCs Final    RBC 01/15/2024 4.43  4.00 - 5.20 x10*6/uL Final    Hemoglobin 01/15/2024 12.9  12.0 - 16.0 g/dL Final    Hematocrit 01/15/2024 40.8  36.0 - 46.0 % Final    MCV 01/15/2024 92  80 - 100 fL Final    MCH 01/15/2024 29.1  26.0 - 34.0 pg Final    MCHC 01/15/2024 31.6 (L)  32.0 - 36.0 g/dL Final    RDW 01/15/2024 13.3  11.5 - 14.5 % Final    Platelets 01/15/2024 392  150 - 450 x10*3/uL Final    Cholesterol 01/15/2024 188  0 - 199 mg/dL Final          Age      Desirable   Borderline High   High     0-19 Y     0 - 169       170 - 199     >/= 200    20-24 Y     0 - 189       190 - 224     >/= 225         >24 Y     0 - 199       200 - 239     >/= 240   **All ranges are based on fasting samples. Specific   therapeutic targets will vary based on patient-specific   cardiac risk.    Pediatric guidelines reference:Pediatrics 2011, 128(S5).Adult guidelines reference: NCEP ATPIII Guidelines,CHARITY 2001, 258:2486-97    Venipuncture immediately after or during the administration of Metamizole may lead to falsely low results. Testing should be performed immediately prior to Metamizole dosing.    HDL-Cholesterol 01/15/2024 66.7  mg/dL Final      Age       Very Low   Low     Normal    High    0-19 Y    < 35      < 40     40-45     ----  20-24 Y    ----     < 40      >45      ----        >24 Y      ----     < 40     40-60       >60      Cholesterol/HDL Ratio 01/15/2024 2.8   Final      Ref Values  Desirable  < 3.4  High Risk  > 5.0    LDL Calculated 01/15/2024 95  <=99 mg/dL Final                                Near   Borderline      AGE      Desirable  Optimal    High     High     Very High     0-19 Y     0 - 109     ---    110-129   >/= 130     ----    20-24 Y     0 - 119     ---    120-159   >/= 160     ----      >24 Y     0 -  99   100-129  130-159   160-189     >/=190      VLDL 01/15/2024 26  0 - 40 mg/dL Final    Triglycerides 01/15/2024 130  0 - 149 mg/dL Final       Age         Desirable   Borderline High   High     Very High   0 D-90 D    19 - 174         ----         ----        ----  91 D- 9 Y     0 -  74        75 -  99     >/= 100      ----    10-19 Y     0 -  89        90 - 129     >/= 130      ----    20-24 Y     0 - 114       115 - 149     >/= 150      ----         >24 Y     0 - 149       150 - 199    200- 499    >/= 500    Venipuncture immediately after or during the administration of Metamizole may lead to falsely low results. Testing should be performed immediately prior to Metamizole dosing.    Non HDL Cholesterol 01/15/2024 121  0 - 149 mg/dL Final          Age       Desirable   Borderline High   High     Very High     0-19 Y     0 - 119       120 - 144     >/= 145    >/= 160    20-24 Y     0 - 149       150 - 189     >/= 190      ----         >24 Y    30 mg/dL above LDL Cholesterol goal      LDL, Direct 01/15/2024 106  0 - 129 mg/dL Final    Glucose 01/15/2024 94  74 - 99 mg/dL Final    Sodium 01/15/2024 140  136 - 145 mmol/L Final    Potassium 01/15/2024 3.9  3.5 - 5.3 mmol/L Final    Chloride 01/15/2024 105  98 - 107 mmol/L Final    Bicarbonate 01/15/2024 29  21 - 32 mmol/L Final    Anion Gap 01/15/2024 10  10 - 20 mmol/L Final    Urea Nitrogen 01/15/2024 16  6 - 23 mg/dL Final    Creatinine 01/15/2024 0.71  0.50 - 1.05 mg/dL Final    eGFR 01/15/2024 >90  >60 mL/min/1.73m*2 Final    Calculations of estimated GFR  are performed using the 2021 CKD-EPI Study Refit equation without the race variable for the IDMS-Traceable creatinine methods.  https://jasn.asnjournals.org/content/early/2021/09/22/ASN.2688698800    Calcium 01/15/2024 9.1  8.6 - 10.3 mg/dL Final    Albumin 01/15/2024 3.8  3.4 - 5.0 g/dL Final    Alkaline Phosphatase 01/15/2024 91  33 - 110 U/L Final    Total Protein 01/15/2024 6.5  6.4 - 8.2 g/dL Final    AST 01/15/2024 31  9 - 39 U/L Final    Bilirubin, Total 01/15/2024 0.4  0.0 - 1.2 mg/dL Final    ALT 01/15/2024 42  7 - 45 U/L Final    Patients treated with Sulfasalazine may generate falsely decreased results for ALT.    Hemoglobin A1C 01/15/2024 6.0 (H)  see below % Final    Estimated Average Glucose 01/15/2024 126  Not Established mg/dL Final    Ferritin 01/15/2024 32  8 - 150 ng/mL Final    Folate, Serum 01/15/2024 >22.3  >5.0 ng/mL Final    Hepatitis C AB 01/15/2024 Nonreactive  Nonreactive Final    Results from patients taking biotin supplements or receiving high-dose biotin therapy should be interpreted with caution due to possible interference with this test. Providers may contact their local laboratory for further information.    Iron 01/15/2024 95  35 - 150 ug/dL Final    UIBC 01/15/2024 270  110 - 370 ug/dL Final    TIBC 01/15/2024 365  240 - 445 ug/dL Final    % Saturation 01/15/2024 26  25 - 45 % Final    Magnesium 01/15/2024 1.84  1.60 - 2.40 mg/dL Final    Vitamin B12 01/15/2024 2,014 (H)  211 - 911 pg/mL Final    Vitamin B6 01/15/2024 96.8  20.0 - 125.0 nmol/L Final    INTERPRETIVE INFORMATION: Vitamin B6 (Pyridoxal 5-Phosphate)    Pyridoxal 5'-phosphate measured in a specimen collected following   an 8-hour or overnight fast accurately indicates vitamin B6   nutritional status. Non-fasting specimen concentration reflects   recent vitamin intake.    This test was developed and its performance characteristics   determined by [facility]. It has not been cleared or   approved by the [place] Food  and Drug Administration. This test was   performed in a CLIA certified laboratory and is intended for   clinical purposes.  Performed By: [facility]  [address] [place] [id number]  : Walter Claros MD, PhD  CLIA Number: [id number]    Vit D, 1,25-Dihydroxy 01/15/2024 57.9  19.9 - 79.3 pg/mL Final    INTERPRETIVE INFORMATION: Vitamin D, 1,25-Dihydroxy    This test is primarily indicated during patient evaluation for   hypercalcemia and renal failure. A normal result does not rule out   Vitamin D deficiency. The recommended test for diagnosing Vitamin   D deficiency is Vitamin D 25-hydroxy.  Performed By: [facility]  [address] [place] [id number]  : Walter Claros MD, PhD  CLIA Number: [id number]    Zinc, Serum or Plasma 01/15/2024 68.6  60.0 - 120.0 ug/dL Final    Comment: INTERPRETIVE INFORMATION: Zinc, Serum or Plasma    Elevated results may be due to skin or collection-related   contamination, including the use of a noncertified metal-free   collection/transport tube. If contamination concerns exist due to   elevated levels of serum/plasma zinc, confirmation with a second   specimen collected in a certified metal-free tube is recommended.    Circulating zinc concentrations are dependent on albumin status   and are depressed with malnutrition.  Zinc may also be lowered   with infection, inflammation, stress, oral contraceptives, and   pregnancy.  Zinc may be elevated with zinc supplementation or   fasting.  Elevated zinc concentrations may interfere with copper   absorption.     This test was developed and its performance characteristics   determined by [facility]. It has not been cleared or   approved by the US Food and Drug Administration. This test was   performed in a CLIA certified laboratory and is intended for   clinical                            purposes.  Performed By: [facility]  [address] [place] [address]  "[place] [id number]  : Walter Claros MD, PhD  CLIA Number: [id number]    Vitamin D, 25-Hydroxy, Total 01/15/2024 53  30 - 100 ng/mL Final    Vitamin B1, Whole Blood 01/15/2024 186 (H)  70 - 180 nmol/L Final    INTERPRETIVE INFORMATION: Vitamin B1, Whole Blood    This assay measures the concentration of thiamine diphosphate   (TDP), the primary active form of vitamin B1. Approximately 90   percent of vitamin B1 present in whole blood is TDP. Thiamine and   thiamine monophosphate, which comprise the remaining 10 percent,   are not measured.    This test was developed and its performance characteristics   determined by [facility]. It has not been cleared or   approved by the US Food and Drug Administration. This test was   performed in a CLIA certified laboratory and is intended for   clinical purposes.  Performed By: [facility]  [address] [place], [place] [id number]  : Walter Claros MD, PhD  CLIA Number: [id number]    Albumin, Urine Random 01/15/2024 26.5  Not established mg/L Final    Creatinine, Urine Random 01/15/2024 120.0  20.0 - 320.0 mg/dL Final    Albumin/Creatine Ratio 01/15/2024 22.1  <30.0 ug/mg Creat Final           Review of Systems   A complete review of systems was performed and all systems were normal except what is noted in the HPI.        Objective   /87   Pulse 84   Temp 35.8 °C (96.5 °F)   Ht 1.727 m (5' 8\")   Wt 99.7 kg (219 lb 12.8 oz)   SpO2 95%   BMI 33.42 kg/m²    Physical Exam  Constitutional:       Appearance: Normal appearance. She is obese.   HENT:      Head: Normocephalic and atraumatic.   Neck:      Vascular: No carotid bruit.   Cardiovascular:      Rate and Rhythm: Normal rate and regular rhythm.      Heart sounds: Normal heart sounds.   Pulmonary:      Effort: Pulmonary effort is normal.      Breath sounds: Normal breath sounds. No wheezing, rhonchi or rales.   Abdominal:      General: Abdomen is flat. Bowel " sounds are normal.      Palpations: Abdomen is soft.      Tenderness: There is no abdominal tenderness. There is no guarding.   Musculoskeletal:         General: Normal range of motion.      Right lower leg: No edema.      Left lower leg: No edema.   Skin:     General: Skin is dry.   Neurological:      General: No focal deficit present.      Mental Status: She is alert and oriented to person, place, and time.   Psychiatric:         Mood and Affect: Mood normal.         Behavior: Behavior normal.         Thought Content: Thought content normal.         Health Maintenance Due   Topic Date Due    Hepatitis B Vaccines (1 of 3 - 3-dose series) Never done    HIV Screening  Never done    Pneumococcal Vaccine: Pediatrics (0 to 5 Years) and At-Risk Patients (6 to 64 Years) (1 - PCV) Never done    Diabetes: Foot Exam  Never done    Diabetes: Retinopathy Screening  Never done    MMR Vaccines (1 of 1 - Standard series) Never done    Influenza Vaccine (1) 09/01/2023    COVID-19 Vaccine (5 - 2023-24 season) 09/01/2023        Assessment/Plan   Problem List Items Addressed This Visit       IFG (impaired fasting glucose)     Stable  continue work on diet  Reevaluate in 6 months.           Relevant Orders    Comprehensive Metabolic Panel    Hemoglobin A1C    Hypertriglyceridemia - Primary     Improved from 198 to 130  continue work on diet  Recheck 6 months          Relevant Orders    Cholesterol, LDL Direct    Lipid Panel    Vitamin D deficiency     Well controlled continue current medication. Reevaluate in 6 months.            Relevant Orders    Vitamin D 25-Hydroxy,Total (for eval of Vitamin D levels)    Zinc deficiency     within normal limits   Recheck 6 months         Relevant Orders    Zinc, Serum or Plasma    Class 1 obesity due to excess calories with serious comorbidity and body mass index (BMI) of 33.0 to 33.9 in adult     Work on diet reviewed with patient.   Recommend at least 150 minutes of cardiovascular exercise  weekly.          RESOLVED: Thiamine deficiency     Start OTC supplement  Recheck 6 months         Relevant Orders    Vitamin B1, Whole Blood         Patient understands and agrees with care plan.           Enma Agee MD

## 2024-02-02 ENCOUNTER — OFFICE VISIT (OUTPATIENT)
Dept: PRIMARY CARE | Facility: CLINIC | Age: 49
End: 2024-02-02
Payer: COMMERCIAL

## 2024-02-02 VITALS
HEART RATE: 84 BPM | SYSTOLIC BLOOD PRESSURE: 123 MMHG | TEMPERATURE: 96.5 F | BODY MASS INDEX: 33.31 KG/M2 | WEIGHT: 219.8 LBS | HEIGHT: 68 IN | OXYGEN SATURATION: 95 % | DIASTOLIC BLOOD PRESSURE: 87 MMHG

## 2024-02-02 DIAGNOSIS — E60 ZINC DEFICIENCY: ICD-10-CM

## 2024-02-02 DIAGNOSIS — E78.1 HYPERTRIGLYCERIDEMIA: Primary | ICD-10-CM

## 2024-02-02 DIAGNOSIS — E66.09 CLASS 1 OBESITY DUE TO EXCESS CALORIES WITH SERIOUS COMORBIDITY AND BODY MASS INDEX (BMI) OF 33.0 TO 33.9 IN ADULT: ICD-10-CM

## 2024-02-02 DIAGNOSIS — R73.01 IFG (IMPAIRED FASTING GLUCOSE): ICD-10-CM

## 2024-02-02 DIAGNOSIS — E55.9 VITAMIN D DEFICIENCY: ICD-10-CM

## 2024-02-02 DIAGNOSIS — E51.9 THIAMINE DEFICIENCY: ICD-10-CM

## 2024-02-02 PROCEDURE — 1036F TOBACCO NON-USER: CPT | Performed by: FAMILY MEDICINE

## 2024-02-02 PROCEDURE — 99214 OFFICE O/P EST MOD 30 MIN: CPT | Performed by: FAMILY MEDICINE

## 2024-02-02 PROCEDURE — 3008F BODY MASS INDEX DOCD: CPT | Performed by: FAMILY MEDICINE

## 2024-02-02 RX ORDER — METFORMIN HYDROCHLORIDE 500 MG/1
500 TABLET, EXTENDED RELEASE ORAL 2 TIMES DAILY
COMMUNITY
Start: 2024-01-09 | End: 2024-02-02 | Stop reason: SINTOL

## 2024-02-02 ASSESSMENT — PATIENT HEALTH QUESTIONNAIRE - PHQ9
2. FEELING DOWN, DEPRESSED OR HOPELESS: NOT AT ALL
1. LITTLE INTEREST OR PLEASURE IN DOING THINGS: NOT AT ALL
SUM OF ALL RESPONSES TO PHQ9 QUESTIONS 1 AND 2: 0

## 2024-03-14 ENCOUNTER — OFFICE VISIT (OUTPATIENT)
Dept: GASTROENTEROLOGY | Facility: CLINIC | Age: 49
End: 2024-03-14
Payer: COMMERCIAL

## 2024-03-14 VITALS
DIASTOLIC BLOOD PRESSURE: 62 MMHG | BODY MASS INDEX: 33.49 KG/M2 | HEIGHT: 68 IN | OXYGEN SATURATION: 99 % | SYSTOLIC BLOOD PRESSURE: 110 MMHG | HEART RATE: 72 BPM | WEIGHT: 221 LBS

## 2024-03-14 DIAGNOSIS — Z12.11 ENCOUNTER FOR SCREENING COLONOSCOPY: Primary | ICD-10-CM

## 2024-03-14 PROCEDURE — 99213 OFFICE O/P EST LOW 20 MIN: CPT | Performed by: PHYSICIAN ASSISTANT

## 2024-03-14 PROCEDURE — 1036F TOBACCO NON-USER: CPT | Performed by: PHYSICIAN ASSISTANT

## 2024-03-14 PROCEDURE — 3008F BODY MASS INDEX DOCD: CPT | Performed by: PHYSICIAN ASSISTANT

## 2024-03-14 RX ORDER — POLYETHYLENE GLYCOL 3350, SODIUM SULFATE ANHYDROUS, SODIUM BICARBONATE, SODIUM CHLORIDE, POTASSIUM CHLORIDE 236; 22.74; 6.74; 5.86; 2.97 G/4L; G/4L; G/4L; G/4L; G/4L
4000 POWDER, FOR SOLUTION ORAL ONCE
Qty: 4000 ML | Refills: 0 | Status: SHIPPED | OUTPATIENT
Start: 2024-03-14 | End: 2024-03-14

## 2024-03-14 RX ORDER — ONDANSETRON 4 MG/1
4 TABLET, ORALLY DISINTEGRATING ORAL EVERY 6 HOURS PRN
Qty: 8 TABLET | Refills: 0 | Status: SHIPPED | OUTPATIENT
Start: 2024-03-14 | End: 2024-03-28

## 2024-03-14 NOTE — PROGRESS NOTES
Subjective   Patient ID: Riddhi Berumen is a 48 y.o. female presents for Follow-up (Lov-7/25/22) and GERD.    Patient's PCP is Dr. Agee    PMH: DM2, hypertriglyceridemia, PCOS, history of bariatric surgery    HPI  Patient was last seen by myself in 2022 for GERD, which is well controlled on omeprazole 20mg daily. She attempted to have screening colonoscopy with Dr. Ferrara in September 2023. Prep was inadequate, but visualized bowel was normal. Patient states that she drank all of the miralax as directed without issue. She is asymptomatic. Denies unexplained weight loss, dysphagia, N/V, abdominal pain, change in bowel habits, melena, hematochezia. She states that she has a BM daily and does not feel constipated. She does report a family history of colon cancer in an uncle.    Prior GI evaluation:  Colonoscopy 9/2023: poor prep  EGD 6/2022: Kait-en-Y gastrojejunostomy healthy    Review of Systems:  Constitutional: No reported fever, chills, or weight loss.  Skin: No reported icterus, lesions, or rash.  Eye: No reported itching, pain, vision changes.  Ear: No reported discharge, hearing loss, or pain.  Nose: No reported congestion, discharge, or epistaxis.  Mouth/throat: No reported dysphagia, hoarseness, or throat pain.  Resp: No reported cough, dyspnea, or wheezing.  Cardiovascular: No reported chest pain, lower extremity edema, or palpitations.   GI: No reported abdominal pain, diarrhea, constipation, change in bowel habits, nausea, or vomiting.  : No reported dysuria, hematuria, or frequency.  Neuro: No reported confusion, memory loss, headaches, or dizziness.  Psych: No reported anxiety, depression, or insomnia.  Musculoskeletal: No reported arthralgia, joint swelling, or myalgias.  Heme/lymph: No reported easy bleeding or bruising, or swollen lymph nodes.  Endocrine: No reported cold/heat intolerance, polydipsia, or polyuria.     Medications:  Prior to Admission medications    Medication Sig Start Date End Date  Taking? Authorizing Provider   ascorbic acid (Vitamin C) 500 mg chewable tablet Chew. 7/20/21  Yes Historical Provider, MD   azelastine (Astelin) 137 mcg (0.1 %) nasal spray azelastine 137 mcg (0.1 %) nasal spray aerosol   INSTILL 2 SPRAYS TWICE A DAY BY INTRANASAL ROUTE FOR 90 DAYS   Yes Historical Provider, MD   calcium carbonate 600 mg calcium (1,500 mg) tablet Take 1 tablet (1,500 mg) by mouth once daily. 7/20/21  Yes Historical Provider, MD   cholecalciferol (Vitamin D-3) 50 mcg (2,000 unit) capsule Take by mouth once daily. 7/20/21  Yes Historical Provider, MD   cyanocobalamin, vitamin B-12, (Vitamin B-12) 1,000 mcg tablet extended release Take by mouth. 7/20/21  Yes Historical Provider, MD   fluticasone (Flonase) 50 mcg/actuation nasal spray Administer into affected nostril(s). 1/5/22  Yes Historical Provider, MD   hydrocortisone-iodoquinoL-aloe 1.9-1 % cream in packet Use twice daily as needed 7/28/23  Yes Enma Agee MD   loratadine (Claritin) 10 mg tablet Take 1 tablet (10 mg) by mouth once daily. 1/5/22  Yes Historical Provider, MD   magnesium oxide 500 mg tablet Take 1 tablet (500 mg) by mouth once daily. 7/20/21  Yes Historical Provider, MD   multivitamin tablet Take 2 tablets by mouth once daily. 7/20/21  Yes Historical Provider, MD   omeprazole (PriLOSEC) 20 mg DR capsule Take 1 capsule (20 mg) by mouth once daily. Do not crush or chew. 7/28/23 7/27/24 Yes Enma Agee MD       Allergies:  Red dye, Doxycycline, Eggplant, Latex, Levofloxacin, Lisinopril, Metformin, Oats, Pecan nut, Promethazine, and Amoxicillin    Past Medical History:  She has a past medical history of Allergic, Essential hypertension (12/09/2015), GERD (gastroesophageal reflux disease), Hypertension, Polycystic ovarian syndrome (07/19/2022), and Weakness of right lower extremity (05/11/2023).    Past Surgical History:  She has a past surgical history that includes Other surgical history (11/11/2019); Other surgical  history (11/11/2019); Other surgical history (11/11/2019); Other surgical history (11/11/2019); Other surgical history (07/18/2022); and Colonoscopy (09/18/2023).    Social History:  She reports that she has never smoked. She has never used smokeless tobacco. She reports current alcohol use of about 5.0 standard drinks of alcohol per week. She reports that she does not use drugs.    Objective   Physical exam:  Constitutional: Well developed, well nourished 48 y.o. year old in no acute distress.   Skin: Warm and dry. Normal turgor. No rash, ulcer, trauma, jaundice.   Eyes: Pupils symmetric and reactive to light.  Respiratory: Clear to auscultation bilaterally. No wheezes, rhonchi, or rales heard.  Cardiovascular: Regular rate and rhythm. S1 and S2 appreciated and normal. No murmur, rub, or gallop heard.   Edema: No edema noted.  GI: Normal bowel sounds. Soft, non-distended, non-tender. No rebound or guarding present. No hepatomegaly or splenomegaly appreciated. Abdominal aorta not palpably abnormal.  Musculoskeletal: Limbs and Joints grossly normal. Full range of motion in major joint.   Neuro: Alert and oriented x 3. Cranial nerves 2-12 grossly intact.   Psych: Normal mood and affect.        Relevant Results Recent labs reviewed in the EMR.  Lab Results   Component Value Date    HGB 12.9 01/15/2024    HGB 13.1 01/16/2023    HGB 12.5 07/14/2022    HGB 12.9 11/20/2021    MCV 92 01/15/2024    MCV 90 01/16/2023    MCV 89 07/14/2022    MCV 92 11/20/2021     01/15/2024     (H) 01/16/2023     07/14/2022     11/20/2021       Lab Results   Component Value Date    FERRITIN 32 01/15/2024    IRON 95 01/15/2024       Lab Results   Component Value Date     01/15/2024    K 3.9 01/15/2024     01/15/2024    BUN 16 01/15/2024    CREATININE 0.71 01/15/2024       Lab Results   Component Value Date    BILITOT 0.4 01/15/2024     Lab Results   Component Value Date    ALT 42 01/15/2024    ALT 15  "07/24/2023    ALT 17 01/16/2023    ALT 15 07/14/2022    AST 31 01/15/2024    AST 21 07/24/2023    AST 16 01/16/2023    AST 22 07/14/2022    ALKPHOS 91 01/15/2024    ALKPHOS 66 07/24/2023    ALKPHOS 57 01/16/2023    ALKPHOS 72 07/14/2022       No results found for: \"CRP\"    No results found for: \"CALPS\"    Radiology: Reviewed imaging reviewed in the EMR.  No results found.    Assessment/Plan   Problem List Items Addressed This Visit             ICD-10-CM    Encounter for screening colonoscopy - Primary Z12.11     Currently asymptomatic. Miralax bowel prep with suboptimal results. Will prescribe golytely and zofran to use PRN for nausea given prior gastric bypass. 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         Relevant Medications    Golytely 236-22.74-6.74 -5.86 gram solution    ondansetron ODT (Zofran-ODT) 4 mg disintegrating tablet    Other Relevant Orders    Colonoscopy Screening; Average Risk Patient         Lisa Chavarria PA-C    "

## 2024-03-14 NOTE — PATIENT INSTRUCTIONS
Thank you for coming in for your appointment.    -Golytely bowel prep prescribed. Due to your gastric bypass state, a few zofran prescriptions were given in case you develop nausea

## 2024-03-14 NOTE — ASSESSMENT & PLAN NOTE
Currently asymptomatic. Miralax bowel prep with suboptimal results. Will prescribe golytely and zofran to use PRN for nausea given prior gastric bypass. 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

## 2024-04-11 ENCOUNTER — TELEPHONE (OUTPATIENT)
Dept: OBSTETRICS AND GYNECOLOGY | Facility: CLINIC | Age: 49
End: 2024-04-11
Payer: COMMERCIAL

## 2024-04-11 DIAGNOSIS — B37.9 YEAST INFECTION: Primary | ICD-10-CM

## 2024-04-11 RX ORDER — FLUCONAZOLE 150 MG/1
150 TABLET ORAL ONCE
Qty: 1 TABLET | Refills: 0 | Status: SHIPPED | OUTPATIENT
Start: 2024-04-11 | End: 2024-04-11

## 2024-04-11 NOTE — TELEPHONE ENCOUNTER
Patient called stating she has a yeast infection been taking over the counter (Monistat) for the last 6 days nothing seems to be working. Asking if you can prescribe something stronger or does she need to be scheduled. Please advise.

## 2024-04-19 ENCOUNTER — OFFICE VISIT (OUTPATIENT)
Dept: OBSTETRICS AND GYNECOLOGY | Facility: CLINIC | Age: 49
End: 2024-04-19
Payer: COMMERCIAL

## 2024-04-19 VITALS
BODY MASS INDEX: 31.81 KG/M2 | SYSTOLIC BLOOD PRESSURE: 118 MMHG | HEIGHT: 70 IN | WEIGHT: 222.2 LBS | DIASTOLIC BLOOD PRESSURE: 72 MMHG

## 2024-04-19 DIAGNOSIS — N95.2 VAGINAL ATROPHY: Primary | ICD-10-CM

## 2024-04-19 DIAGNOSIS — N76.0 BV (BACTERIAL VAGINOSIS): ICD-10-CM

## 2024-04-19 DIAGNOSIS — Z12.31 ENCOUNTER FOR SCREENING MAMMOGRAM FOR MALIGNANT NEOPLASM OF BREAST: ICD-10-CM

## 2024-04-19 DIAGNOSIS — B96.89 BV (BACTERIAL VAGINOSIS): ICD-10-CM

## 2024-04-19 PROCEDURE — 1036F TOBACCO NON-USER: CPT | Performed by: OBSTETRICS & GYNECOLOGY

## 2024-04-19 PROCEDURE — 3008F BODY MASS INDEX DOCD: CPT | Performed by: OBSTETRICS & GYNECOLOGY

## 2024-04-19 PROCEDURE — 99396 PREV VISIT EST AGE 40-64: CPT | Performed by: OBSTETRICS & GYNECOLOGY

## 2024-04-19 RX ORDER — ESTRADIOL 10 UG/1
10 INSERT VAGINAL 2 TIMES WEEKLY
Qty: 18 TABLET | Refills: 3 | Status: SHIPPED | OUTPATIENT
Start: 2024-04-22

## 2024-04-19 RX ORDER — CLINDAMYCIN PHOSPHATE 100 MG/5G
1 GEL VAGINAL ONCE
Qty: 8 G | Refills: 1 | Status: SHIPPED | OUTPATIENT
Start: 2024-04-19 | End: 2024-04-19

## 2024-04-19 NOTE — PROGRESS NOTES
Subjective   Patient ID: Riddhi Berumen is a 49 y.o. female who presents for No chief complaint on file..  HPI 49 years old G1, P1 Who has not have a cycle for several months presents with chief complaint of vaginal discharge that is clear but is large that she has to wear a pad and sometimes change the pad couple of times during the day.  She denies any vaginal odor or itching.  She says that she feels that she has a UTI but the test have been negative.  She is currently not sexually active.  She denies any significant hot flashes or night sweats.  She is due for yearly.     Review of Systems   Genitourinary:  Positive for vaginal discharge.   All other systems reviewed and are negative.      Objective   Physical Exam  Vitals reviewed.   Constitutional:       Appearance: Normal appearance.   HENT:      Head: Normocephalic and atraumatic.      Nose: Nose normal.   Cardiovascular:      Rate and Rhythm: Normal rate and regular rhythm.   Pulmonary:      Effort: Pulmonary effort is normal.      Breath sounds: Normal breath sounds.   Chest:      Chest wall: No mass.   Breasts:     Right: Normal.      Left: Normal.   Abdominal:      General: Abdomen is flat. Bowel sounds are normal. There is no distension.      Palpations: Abdomen is soft. There is no mass.   Genitourinary:     General: Normal vulva.      Vagina: Normal.      Cervix: Normal.      Uterus: Normal.       Adnexa: Right adnexa normal and left adnexa normal.      Rectum: Normal.   Musculoskeletal:         General: Normal range of motion.      Cervical back: Normal range of motion.   Skin:     General: Skin is warm and dry.   Neurological:      General: No focal deficit present.      Mental Status: She is alert.   Psychiatric:         Mood and Affect: Mood normal.         Behavior: Behavior normal.         Assessment/Plan   Problem List Items Addressed This Visit    None  Visit Diagnoses         Codes    Vaginal atrophy    -  Primary N95.2    Relevant  Medications    estradiol (Vagifem) 10 mcg tablet vaginal tablet (Start on 4/22/2024)    BV (bacterial vaginosis)     N76.0, B96.89    Relevant Medications    clindamycin phosphate (Xaciato) 2 % gel        No Pap smear was done.  Pap every 3 to 5 years.  The vagina appears atrophic and dry no significant discharge but pH of the vagina was in the alkaline range.  I have recommended course of Cleocin gel x 1 night  for bacterial vaginosis and also recommended that she goes on vaginal estrogen.  I have recommended Vagifem both of these were E scribed to her pharmacy and instructions were reviewed I have recommended follow-up in 2 to 3 months.         Jaswinder Galdamez MD 04/19/24 11:05 AM

## 2024-05-06 ENCOUNTER — TELEPHONE (OUTPATIENT)
Dept: OBSTETRICS AND GYNECOLOGY | Facility: CLINIC | Age: 49
End: 2024-05-06
Payer: COMMERCIAL

## 2024-05-06 DIAGNOSIS — B96.89 BV (BACTERIAL VAGINOSIS): Primary | ICD-10-CM

## 2024-05-06 DIAGNOSIS — N76.0 BV (BACTERIAL VAGINOSIS): Primary | ICD-10-CM

## 2024-05-06 RX ORDER — METRONIDAZOLE 500 MG/1
500 TABLET ORAL 2 TIMES DAILY
Qty: 14 TABLET | Refills: 0 | Status: SHIPPED | OUTPATIENT
Start: 2024-05-06 | End: 2024-05-13

## 2024-05-06 NOTE — TELEPHONE ENCOUNTER
Patient called stating she has been inserting a vanginal insertion for a bacterial infection. Stating it is back and she is experiencing discharge stating she doesn't think its working and very painful. Please advise.

## 2024-06-07 ENCOUNTER — PREP FOR PROCEDURE (OUTPATIENT)
Dept: GASTROENTEROLOGY | Facility: CLINIC | Age: 49
End: 2024-06-07
Payer: COMMERCIAL

## 2024-06-07 RX ORDER — SODIUM CHLORIDE 9 MG/ML
20 INJECTION, SOLUTION INTRAVENOUS CONTINUOUS
Status: CANCELLED | OUTPATIENT
Start: 2024-06-07

## 2024-06-10 ENCOUNTER — ANESTHESIA (OUTPATIENT)
Dept: GASTROENTEROLOGY | Facility: HOSPITAL | Age: 49
End: 2024-06-10
Payer: COMMERCIAL

## 2024-06-10 ENCOUNTER — HOSPITAL ENCOUNTER (OUTPATIENT)
Dept: GASTROENTEROLOGY | Facility: HOSPITAL | Age: 49
Discharge: HOME | End: 2024-06-10
Payer: COMMERCIAL

## 2024-06-10 ENCOUNTER — ANESTHESIA EVENT (OUTPATIENT)
Dept: GASTROENTEROLOGY | Facility: HOSPITAL | Age: 49
End: 2024-06-10
Payer: COMMERCIAL

## 2024-06-10 VITALS
SYSTOLIC BLOOD PRESSURE: 114 MMHG | OXYGEN SATURATION: 97 % | TEMPERATURE: 97.6 F | DIASTOLIC BLOOD PRESSURE: 74 MMHG | RESPIRATION RATE: 16 BRPM | HEART RATE: 81 BPM

## 2024-06-10 DIAGNOSIS — Z12.11 ENCOUNTER FOR SCREENING COLONOSCOPY: ICD-10-CM

## 2024-06-10 LAB — PREGNANCY TEST URINE, POC: NEGATIVE

## 2024-06-10 PROCEDURE — 2500000004 HC RX 250 GENERAL PHARMACY W/ HCPCS (ALT 636 FOR OP/ED): Performed by: LICENSED PRACTICAL NURSE

## 2024-06-10 PROCEDURE — 45380 COLONOSCOPY AND BIOPSY: CPT | Performed by: INTERNAL MEDICINE

## 2024-06-10 PROCEDURE — 2500000005 HC RX 250 GENERAL PHARMACY W/O HCPCS: Performed by: LICENSED PRACTICAL NURSE

## 2024-06-10 PROCEDURE — 81025 URINE PREGNANCY TEST: CPT | Performed by: INTERNAL MEDICINE

## 2024-06-10 PROCEDURE — 3700000001 HC GENERAL ANESTHESIA TIME - INITIAL BASE CHARGE

## 2024-06-10 PROCEDURE — 3700000002 HC GENERAL ANESTHESIA TIME - EACH INCREMENTAL 1 MINUTE

## 2024-06-10 PROCEDURE — 7100000010 HC PHASE TWO TIME - EACH INCREMENTAL 1 MINUTE

## 2024-06-10 PROCEDURE — 7100000009 HC PHASE TWO TIME - INITIAL BASE CHARGE

## 2024-06-10 RX ORDER — LIDOCAINE HYDROCHLORIDE 20 MG/ML
INJECTION, SOLUTION INFILTRATION; PERINEURAL AS NEEDED
Status: DISCONTINUED | OUTPATIENT
Start: 2024-06-10 | End: 2024-06-10

## 2024-06-10 RX ORDER — PROPOFOL 10 MG/ML
INJECTION, EMULSION INTRAVENOUS AS NEEDED
Status: DISCONTINUED | OUTPATIENT
Start: 2024-06-10 | End: 2024-06-10

## 2024-06-10 RX ORDER — SODIUM CHLORIDE 9 MG/ML
20 INJECTION, SOLUTION INTRAVENOUS CONTINUOUS
Status: DISCONTINUED | OUTPATIENT
Start: 2024-06-10 | End: 2024-06-11 | Stop reason: HOSPADM

## 2024-06-10 RX ADMIN — LIDOCAINE HYDROCHLORIDE 30 MG: 20 INJECTION, SOLUTION INFILTRATION; PERINEURAL at 08:44

## 2024-06-10 RX ADMIN — PROPOFOL 50 MG: 10 INJECTION, EMULSION INTRAVENOUS at 08:53

## 2024-06-10 RX ADMIN — PROPOFOL 50 MG: 10 INJECTION, EMULSION INTRAVENOUS at 08:44

## 2024-06-10 RX ADMIN — PROPOFOL 50 MG: 10 INJECTION, EMULSION INTRAVENOUS at 08:47

## 2024-06-10 RX ADMIN — PROPOFOL 50 MG: 10 INJECTION, EMULSION INTRAVENOUS at 08:58

## 2024-06-10 SDOH — HEALTH STABILITY: MENTAL HEALTH: CURRENT SMOKER: 0

## 2024-06-10 ASSESSMENT — ENCOUNTER SYMPTOMS
TROUBLE SWALLOWING: 0
CHILLS: 0
UNEXPECTED WEIGHT CHANGE: 0
DIARRHEA: 0
ABDOMINAL PAIN: 0
NAUSEA: 0
COLOR CHANGE: 0
ANAL BLEEDING: 0
ABDOMINAL DISTENTION: 0
VOMITING: 0
CONSTIPATION: 0
SHORTNESS OF BREATH: 0
FEVER: 0
RECTAL PAIN: 0
BLOOD IN STOOL: 0

## 2024-06-10 ASSESSMENT — PAIN SCALES - GENERAL
PAINLEVEL_OUTOF10: 5 - MODERATE PAIN
PAINLEVEL_OUTOF10: 0 - NO PAIN
PAINLEVEL_OUTOF10: 0 - NO PAIN
PAIN_LEVEL: 0
PAINLEVEL_OUTOF10: 0 - NO PAIN

## 2024-06-10 ASSESSMENT — PAIN - FUNCTIONAL ASSESSMENT
PAIN_FUNCTIONAL_ASSESSMENT: 0-10

## 2024-06-10 NOTE — H&P
History Of Present Illness    Riddhi Berumen is a 49 y.o. female with a history of diabetes, PCOS, and GERD who presents for screening colonoscopy.    She previously had a colonoscopy in 2023 that was limited by prep quality. There is no family history of colorectal cancer.         Past Medical History  Past Medical History:   Diagnosis Date    Allergic     Essential hypertension 12/09/2015    GERD (gastroesophageal reflux disease)     Hypertension     Polycystic ovarian syndrome 07/19/2022    PCOS (polycystic ovarian syndrome)    Weakness of right lower extremity 05/11/2023     Surgical History  Past Surgical History:   Procedure Laterality Date    COLONOSCOPY  09/18/2023    OTHER SURGICAL HISTORY  11/11/2019    Kait-en-Y gastric bypass    OTHER SURGICAL HISTORY  11/11/2019    Tonsillectomy with adenoidectomy    OTHER SURGICAL HISTORY  11/11/2019    Exploratory laparoscopy    OTHER SURGICAL HISTORY  11/11/2019    Cholecystectomy    OTHER SURGICAL HISTORY  07/18/2022    Endoscopy     Social History  She reports that she has never smoked. She has never used smokeless tobacco. She reports current alcohol use of about 2.0 standard drinks of alcohol per week. She reports that she does not use drugs.    Family History  Family History   Problem Relation Name Age of Onset    Stroke Mother      Diabetes Mother      Hypothyroidism Mother      Diabetes Father      Heart disease Father      Boswell's esophagus Father      Cancer Mother's Sister      Breast cancer Mother's Sister      Cancer Father's Brother          Allergies  Allergies   Allergen Reactions    Red Dye Hives, Itching and Swelling     Throat swells    Doxycycline Unknown    Eggplant Hives and Itching    Latex Itching    Levofloxacin Unknown    Lisinopril Swelling and Unknown    Metformin Diarrhea    Oats Unknown    Pecan Nut Itching    Promethazine Unknown     Other reaction(s): Other (See Comments)   Trouble with urination    Amoxicillin Unknown     Other  reaction(s): GI Upset, Other (See Comments)   stomach cramping   Abdominal cramping     Review of Systems   Constitutional:  Negative for chills, fever and unexpected weight change.   HENT:  Negative for trouble swallowing.    Respiratory:  Negative for shortness of breath.    Cardiovascular:  Negative for chest pain.   Gastrointestinal:  Negative for abdominal distention, abdominal pain, anal bleeding, blood in stool, constipation, diarrhea, nausea, rectal pain and vomiting.   Skin:  Negative for color change.        Physical Exam  Vitals reviewed.   Constitutional:       General: She is not in acute distress.     Appearance: She is not ill-appearing.   HENT:      Mouth/Throat:      Comments: Mallampati: 2  Cardiovascular:      Rate and Rhythm: Normal rate and regular rhythm.      Heart sounds: Normal heart sounds. No murmur heard.  Pulmonary:      Effort: Pulmonary effort is normal. No respiratory distress.      Breath sounds: Normal breath sounds. No wheezing.   Abdominal:      General: Bowel sounds are normal.      Palpations: Abdomen is soft.      Tenderness: There is no abdominal tenderness.   Skin:     General: Skin is warm and dry.   Neurological:      General: No focal deficit present.      Mental Status: She is alert and oriented to person, place, and time.   Psychiatric:         Mood and Affect: Mood normal.         Behavior: Behavior normal.         Thought Content: Thought content normal.         Judgment: Judgment normal.          Last Recorded Vitals  Blood pressure 138/88, pulse 82, temperature 36.4 °C (97.5 °F), temperature source Temporal, resp. rate 18, last menstrual period 10/18/2023, SpO2 98%.    Assessment/Plan   Colonoscopy with MAC sedation in endo, ASA 2       Serge Ferrara MD

## 2024-06-10 NOTE — ANESTHESIA PREPROCEDURE EVALUATION
Patient: Riddhi Berumen    Procedure Information       Date/Time: 06/10/24 0900    Scheduled providers: Serge Ferrara MD    Procedure: COLONOSCOPY    Location:  [facility]            Relevant Problems   Anesthesia (within normal limits)      Cardiac   (+) Hypertriglyceridemia      Neuro   (+) Sciatica of right side      GI   (+) GERD (gastroesophageal reflux disease)      Endocrine   (+) Class 1 obesity due to excess calories with serious comorbidity and body mass index (BMI) of 33.0 to 33.9 in adult      Musculoskeletal   (+) Primary osteoarthritis of right hip      HEENT   (+) Conductive hearing loss   (+) Sensorineural hearing loss, bilateral      GYN   (+) PCOS (polycystic ovarian syndrome)       Clinical information reviewed:    Allergies  Meds     OB Status           NPO Detail:  NPO/Void Status  Date of Last Liquid: 06/10/24  Time of Last Liquid: 0630  Date of Last Solid: 06/08/24  Time of Last Solid: 1930  Last Intake Type: Clear fluids         Physical Exam    Airway  Mallampati: II  TM distance: <3 FB  Neck ROM: full     Cardiovascular    Dental - normal exam     Pulmonary    Abdominal            Anesthesia Plan    History of general anesthesia?: yes  History of complications of general anesthesia?: no    ASA 3     MAC     The patient is not a current smoker.    intravenous induction   Anesthetic plan and risks discussed with patient.

## 2024-06-10 NOTE — ANESTHESIA POSTPROCEDURE EVALUATION
Patient: Riddhi Berumen    Procedure Summary       Date: 06/10/24 Room / Location: [facility]    Anesthesia Start: 0841 Anesthesia Stop: 0907    Procedure: COLONOSCOPY Diagnosis: Encounter for screening colonoscopy    Scheduled Providers: Serge Ferrara MD Responsible Provider: GREGOR España    Anesthesia Type: MAC ASA Status: 3            Anesthesia Type: MAC    Vitals Value Taken Time   /72 06/10/24 0906   Temp 36.3 °C (97.4 °F) 06/10/24 0906   Pulse 77 06/10/24 0906   Resp 16 06/10/24 0906   SpO2 98 % 06/10/24 0906       Anesthesia Post Evaluation    Patient location during evaluation: PACU  Patient participation: complete - patient participated  Level of consciousness: awake and alert  Pain score: 0  Pain management: adequate  Airway patency: patent  Cardiovascular status: acceptable  Respiratory status: acceptable  Hydration status: acceptable  Postoperative Nausea and Vomiting: none        There were no known notable events for this encounter.

## 2024-06-11 NOTE — ADDENDUM NOTE
Encounter addended by: Johnathan Rock RN on: 6/11/2024 1:31 PM   Actions taken: Contacts section saved, Flowsheet accepted

## 2024-06-18 LAB
LABORATORY COMMENT REPORT: NORMAL
PATH REPORT.FINAL DX SPEC: NORMAL
PATH REPORT.GROSS SPEC: NORMAL
PATH REPORT.TOTAL CANCER: NORMAL

## 2024-07-29 ENCOUNTER — LAB (OUTPATIENT)
Dept: LAB | Facility: LAB | Age: 49
End: 2024-07-29
Payer: COMMERCIAL

## 2024-07-29 DIAGNOSIS — R73.01 IFG (IMPAIRED FASTING GLUCOSE): ICD-10-CM

## 2024-07-29 DIAGNOSIS — E55.9 VITAMIN D DEFICIENCY: ICD-10-CM

## 2024-07-29 DIAGNOSIS — E51.9 THIAMINE DEFICIENCY: ICD-10-CM

## 2024-07-29 DIAGNOSIS — E60 ZINC DEFICIENCY: ICD-10-CM

## 2024-07-29 DIAGNOSIS — E78.1 HYPERTRIGLYCERIDEMIA: ICD-10-CM

## 2024-07-29 LAB
25(OH)D3 SERPL-MCNC: 55 NG/ML (ref 30–100)
ALBUMIN SERPL BCP-MCNC: 4.2 G/DL (ref 3.4–5)
ALP SERPL-CCNC: 102 U/L (ref 33–110)
ALT SERPL W P-5'-P-CCNC: 29 U/L (ref 7–45)
ANION GAP SERPL CALC-SCNC: 10 MMOL/L (ref 10–20)
AST SERPL W P-5'-P-CCNC: 36 U/L (ref 9–39)
BILIRUB SERPL-MCNC: 0.4 MG/DL (ref 0–1.2)
BUN SERPL-MCNC: 16 MG/DL (ref 6–23)
CALCIUM SERPL-MCNC: 9.5 MG/DL (ref 8.6–10.3)
CHLORIDE SERPL-SCNC: 104 MMOL/L (ref 98–107)
CHOLEST SERPL-MCNC: 203 MG/DL (ref 0–199)
CHOLESTEROL/HDL RATIO: 3.2
CO2 SERPL-SCNC: 28 MMOL/L (ref 21–32)
CREAT SERPL-MCNC: 0.74 MG/DL (ref 0.5–1.05)
EGFRCR SERPLBLD CKD-EPI 2021: >90 ML/MIN/1.73M*2
EST. AVERAGE GLUCOSE BLD GHB EST-MCNC: 134 MG/DL
GLUCOSE SERPL-MCNC: 88 MG/DL (ref 74–99)
HBA1C MFR BLD: 6.3 %
HDLC SERPL-MCNC: 63.6 MG/DL
LDLC SERPL CALC-MCNC: 103 MG/DL
LDLC SERPL DIRECT ASSAY-MCNC: 114 MG/DL (ref 0–129)
NON HDL CHOLESTEROL: 139 MG/DL (ref 0–149)
POTASSIUM SERPL-SCNC: 4.8 MMOL/L (ref 3.5–5.3)
PROT SERPL-MCNC: 7.4 G/DL (ref 6.4–8.2)
SODIUM SERPL-SCNC: 137 MMOL/L (ref 136–145)
TRIGL SERPL-MCNC: 182 MG/DL (ref 0–149)
VLDL: 36 MG/DL (ref 0–40)

## 2024-07-29 PROCEDURE — 80053 COMPREHEN METABOLIC PANEL: CPT

## 2024-07-29 PROCEDURE — 80061 LIPID PANEL: CPT

## 2024-07-29 PROCEDURE — 82306 VITAMIN D 25 HYDROXY: CPT

## 2024-07-29 PROCEDURE — 83721 ASSAY OF BLOOD LIPOPROTEIN: CPT

## 2024-07-29 PROCEDURE — 36415 COLL VENOUS BLD VENIPUNCTURE: CPT

## 2024-07-29 PROCEDURE — 84425 ASSAY OF VITAMIN B-1: CPT

## 2024-07-29 PROCEDURE — 84630 ASSAY OF ZINC: CPT

## 2024-07-29 PROCEDURE — 83036 HEMOGLOBIN GLYCOSYLATED A1C: CPT

## 2024-07-31 LAB — ZINC SERPL-MCNC: 73.4 UG/DL (ref 60–120)

## 2024-08-03 LAB — VIT B1 PYROPHOSHATE BLD-SCNC: 157 NMOL/L (ref 70–180)

## 2024-08-04 NOTE — PROGRESS NOTES
Subjective   Patient ID: Riddhi Berumen is a 49 y.o. female who presents for Follow-up.  HPI  Patient presents today for follow up labs and chronic conditions.  Patient feels well. No other complaints or concerns.    The patient's relevant past medical, surgical, family, and social history was reviewed in [facility].  All pertinent lab work and results for this visit were reviewed with patient.    Lab on 07/29/2024   Component Date Value Ref Range Status    LDL, Direct 07/29/2024 114  0 - 129 mg/dL Final    Cholesterol 07/29/2024 203 (H)  0 - 199 mg/dL Final          Age      Desirable   Borderline High   High     0-19 Y     0 - 169       170 - 199     >/= 200    20-24 Y     0 - 189       190 - 224     >/= 225         >24 Y     0 - 199       200 - 239     >/= 240   **All ranges are based on fasting samples. Specific   therapeutic targets will vary based on patient-specific   cardiac risk.    Pediatric guidelines reference:Pediatrics 2011, 128(S5).Adult guidelines reference: NCEP ATPIII Guidelines,CHARITY 2001, 258:2486-97    Venipuncture immediately after or during the administration of Metamizole may lead to falsely low results. Testing should be performed immediately prior to Metamizole dosing.    HDL-Cholesterol 07/29/2024 63.6  mg/dL Final      Age       Very Low   Low     Normal    High    0-19 Y    < 35      < 40     40-45     ----  20-24 Y    ----     < 40      >45      ----        >24 Y      ----     < 40     40-60      >60      Cholesterol/HDL Ratio 07/29/2024 3.2   Final      Ref Values  Desirable  < 3.4  High Risk  > 5.0    LDL Calculated 07/29/2024 103 (H)  <=99 mg/dL Final                                Near   Borderline      AGE      Desirable  Optimal    High     High     Very High     0-19 Y     0 - 109     ---    110-129   >/= 130     ----    20-24 Y     0 - 119     ---    120-159   >/= 160     ----      >24 Y     0 -  99   100-129  130-159   160-189     >/=190      VLDL 07/29/2024 36  0 - 40 mg/dL Final     Triglycerides 07/29/2024 182 (H)  0 - 149 mg/dL Final       Age         Desirable   Borderline High   High     Very High   0 D-90 D    19 - 174         ----         ----        ----  91 D- 9 Y     0 -  74        75 -  99     >/= 100      ----    10-19 Y     0 -  89        90 - 129     >/= 130      ----    20-24 Y     0 - 114       115 - 149     >/= 150      ----         >24 Y     0 - 149       150 - 199    200- 499    >/= 500    Venipuncture immediately after or during the administration of Metamizole may lead to falsely low results. Testing should be performed immediately prior to Metamizole dosing.    Non HDL Cholesterol 07/29/2024 139  0 - 149 mg/dL Final          Age       Desirable   Borderline High   High     Very High     0-19 Y     0 - 119       120 - 144     >/= 145    >/= 160    20-24 Y     0 - 149       150 - 189     >/= 190      ----         >24 Y    30 mg/dL above LDL Cholesterol goal      Glucose 07/29/2024 88  74 - 99 mg/dL Final    Sodium 07/29/2024 137  136 - 145 mmol/L Final    Potassium 07/29/2024 4.8  3.5 - 5.3 mmol/L Final    MILD HEMOLYSIS DETECTED. The result may be falsely elevated due to hemolysis or other interferents. Clinical correlation is recommended. Repeat testing may be considered.    Chloride 07/29/2024 104  98 - 107 mmol/L Final    Bicarbonate 07/29/2024 28  21 - 32 mmol/L Final    Anion Gap 07/29/2024 10  10 - 20 mmol/L Final    Urea Nitrogen 07/29/2024 16  6 - 23 mg/dL Final    Creatinine 07/29/2024 0.74  0.50 - 1.05 mg/dL Final    eGFR 07/29/2024 >90  >60 mL/min/1.73m*2 Final    Calculations of estimated GFR are performed using the 2021 CKD-EPI Study Refit equation without the race variable for the IDMS-Traceable creatinine methods.  https://jasn.asnjournals.org/content/early/2021/09/22/ASN.4844456016    Calcium 07/29/2024 9.5  8.6 - 10.3 mg/dL Final    Albumin 07/29/2024 4.2  3.4 - 5.0 g/dL Final    Alkaline Phosphatase 07/29/2024 102  33 - 110 U/L Final    Total Protein  07/29/2024 7.4  6.4 - 8.2 g/dL Final    AST 07/29/2024 36  9 - 39 U/L Final    MILD HEMOLYSIS DETECTED. The result may be falsely elevated due to hemolysis or other interferents. Clinical correlation is recommended. Repeat testing may be considered.    Bilirubin, Total 07/29/2024 0.4  0.0 - 1.2 mg/dL Final    ALT 07/29/2024 29  7 - 45 U/L Final    Patients treated with Sulfasalazine may generate falsely decreased results for ALT.    Hemoglobin A1C 07/29/2024 6.3 (H)  see below % Final    Estimated Average Glucose 07/29/2024 134  Not Established mg/dL Final    Vitamin D, 25-Hydroxy, Total 07/29/2024 55  30 - 100 ng/mL Final    Zinc, Serum or Plasma 07/29/2024 73.4  60.0 - 120.0 ug/dL Final    Comment: INTERPRETIVE INFORMATION: Zinc, Serum or Plasma    Elevated results may be due to skin or collection-related   contamination, including the use of a noncertified metal-free   collection/transport tube. If contamination concerns exist due to   elevated levels of serum/plasma zinc, confirmation with a second   specimen collected in a certified metal-free tube is recommended.    Circulating zinc concentrations are dependent on albumin status   and are depressed with malnutrition.  Zinc may also be lowered   with infection, inflammation, stress, oral contraceptives, and   pregnancy.  Zinc may be elevated with zinc supplementation or   fasting.  Elevated zinc concentrations may interfere with copper   absorption.     This test was developed and its performance characteristics   determined by [facility]. It has not been cleared or   approved by the US Food and Drug Administration. This test was   performed in a CLIA certified laboratory and is intended for   clinical                            purposes.  Performed By: [facility]  [address] [place] [id number]  : Walter Claros MD, PhD  CLIA Number: [id number]    Vitamin B1, Whole Blood 07/29/2024 157  70 - 180 nmol/L Final     "INTERPRETIVE INFORMATION: Vitamin B1, Whole Blood    This assay measures the concentration of thiamine diphosphate   (TDP), the primary active form of vitamin B1. Approximately 90   percent of vitamin B1 present in whole blood is TDP. Thiamine and   thiamine monophosphate, which comprise the remaining 10 percent,   are not measured.    This test was developed and its performance characteristics   determined by [facility]. It has not been cleared or   approved by the US Food and Drug Administration. This test was   performed in a CLIA certified laboratory and is intended for   clinical purposes.  Performed By: [facility]  [address] [place] [id number]  : Walter Claros MD, PhD  CLIA Number: [id number]           Review of Systems   A complete review of systems was performed and all systems were normal except what is noted in the HPI.        Objective   /70   Pulse 89   Temp 36.7 °C (98 °F)   Ht 1.727 m (5' 8\")   Wt 102 kg (225 lb 9.6 oz)   SpO2 96%   BMI 34.30 kg/m²    Physical Exam  Constitutional:       Appearance: Normal appearance. She is obese.   HENT:      Head: Normocephalic and atraumatic.   Neck:      Vascular: No carotid bruit.   Cardiovascular:      Rate and Rhythm: Normal rate and regular rhythm.      Heart sounds: Normal heart sounds.   Pulmonary:      Effort: Pulmonary effort is normal.      Breath sounds: Normal breath sounds. No wheezing, rhonchi or rales.   Abdominal:      General: Abdomen is flat. Bowel sounds are normal.      Palpations: Abdomen is soft.      Tenderness: There is no abdominal tenderness. There is no guarding.   Musculoskeletal:         General: Normal range of motion.      Right lower leg: No edema.      Left lower leg: No edema.   Skin:     General: Skin is dry.   Neurological:      General: No focal deficit present.      Mental Status: She is alert and oriented to person, place, and time.   Psychiatric:         Mood and " Affect: Mood normal.         Behavior: Behavior normal.         Thought Content: Thought content normal.         Health Maintenance Due   Topic Date Due    HIV Screening  Never done    Pneumococcal Vaccine: Pediatrics (0 to 5 Years) and At-Risk Patients (6 to 64 Years) (1 of 2 - PCV) Never done    Hepatitis B Vaccines (1 of 3 - 19+ 3-dose series) Never done    MMR Vaccines (1 of 1 - Standard series) Never done    COVID-19 Vaccine (5 - 2023-24 season) 09/01/2023    Influenza Vaccine (1) 09/01/2024        Assessment/Plan   Problem List Items Addressed This Visit       IFG (impaired fasting glucose) - Primary     Up from 6 to 6.3  Work on diet reviewed with patient.   Increase physical activity as able   Recheck 6 months          Relevant Orders    TSH with reflex to Free T4 if abnormal    Comprehensive Metabolic Panel    Hemoglobin A1C    GERD (gastroesophageal reflux disease)    Relevant Medications    omeprazole (PriLOSEC) 20 mg DR capsule    H/O bariatric surgery    Relevant Orders    CBC    Ferritin    Folate    Iron and TIBC    Magnesium    Parathyroid Hormone, Intact    Vitamin B12    Vitamin B6    Hypertriglyceridemia     Up from 130 to 182  Work on diet reviewed with patient.   Recheck 6 months          Relevant Orders    TSH with reflex to Free T4 if abnormal    Lipid Panel    Cholesterol, LDL Direct    Vitamin D deficiency     Well controlled. Continue current medicine and recheck in 6 months.           Relevant Orders    Vitamin D 25-Hydroxy,Total (for eval of Vitamin D levels)    Zinc deficiency     within normal limits   Recheck 6 months         Relevant Orders    Zinc, Serum or Plasma    Class 1 obesity due to excess calories with serious comorbidity and body mass index (BMI) of 33.0 to 33.9 in adult     Work on diet reviewed with patient.   Recommend at least 150 minutes of cardiovascular exercise weekly.          Annual physical exam     Yearly physical done.  Shingrix series complete.  Boostrix  7/23  colonoscopy 6/24  Mammogram 10/23  PAP 7/22  nonsmoker              Patient understands and agrees with care plan.           Enma Agee MD

## 2024-08-04 NOTE — ASSESSMENT & PLAN NOTE
Up from 6 to 6.3  Work on diet reviewed with patient.   Increase physical activity as able   Recheck 6 months

## 2024-08-05 ENCOUNTER — APPOINTMENT (OUTPATIENT)
Dept: PRIMARY CARE | Facility: CLINIC | Age: 49
End: 2024-08-05
Payer: COMMERCIAL

## 2024-08-05 VITALS
HEIGHT: 68 IN | DIASTOLIC BLOOD PRESSURE: 70 MMHG | HEART RATE: 89 BPM | TEMPERATURE: 98 F | WEIGHT: 225.6 LBS | BODY MASS INDEX: 34.19 KG/M2 | OXYGEN SATURATION: 96 % | SYSTOLIC BLOOD PRESSURE: 132 MMHG

## 2024-08-05 DIAGNOSIS — E66.09 CLASS 1 OBESITY DUE TO EXCESS CALORIES WITH SERIOUS COMORBIDITY AND BODY MASS INDEX (BMI) OF 33.0 TO 33.9 IN ADULT: ICD-10-CM

## 2024-08-05 DIAGNOSIS — Z00.00 ANNUAL PHYSICAL EXAM: Primary | ICD-10-CM

## 2024-08-05 DIAGNOSIS — E78.1 HYPERTRIGLYCERIDEMIA: ICD-10-CM

## 2024-08-05 DIAGNOSIS — Z98.84 H/O BARIATRIC SURGERY: ICD-10-CM

## 2024-08-05 DIAGNOSIS — K21.9 GASTROESOPHAGEAL REFLUX DISEASE WITHOUT ESOPHAGITIS: ICD-10-CM

## 2024-08-05 DIAGNOSIS — E55.9 VITAMIN D DEFICIENCY: ICD-10-CM

## 2024-08-05 DIAGNOSIS — E60 ZINC DEFICIENCY: ICD-10-CM

## 2024-08-05 DIAGNOSIS — R73.01 IFG (IMPAIRED FASTING GLUCOSE): ICD-10-CM

## 2024-08-05 PROCEDURE — 3008F BODY MASS INDEX DOCD: CPT | Performed by: FAMILY MEDICINE

## 2024-08-05 PROCEDURE — 99214 OFFICE O/P EST MOD 30 MIN: CPT | Performed by: FAMILY MEDICINE

## 2024-08-05 PROCEDURE — 99396 PREV VISIT EST AGE 40-64: CPT | Performed by: FAMILY MEDICINE

## 2024-08-05 PROCEDURE — 1036F TOBACCO NON-USER: CPT | Performed by: FAMILY MEDICINE

## 2024-08-05 RX ORDER — AZELAIC ACID 0.15 G/G
1 GEL TOPICAL 2 TIMES DAILY
COMMUNITY
Start: 2024-07-27

## 2024-08-05 RX ORDER — SPIRONOLACTONE 50 MG/1
50 TABLET, FILM COATED ORAL NIGHTLY
COMMUNITY
Start: 2024-07-25

## 2024-08-05 RX ORDER — OMEPRAZOLE 20 MG/1
20 CAPSULE, DELAYED RELEASE ORAL DAILY
Qty: 30 CAPSULE | Refills: 11 | Status: SHIPPED | OUTPATIENT
Start: 2024-08-05 | End: 2025-08-05

## 2024-08-05 RX ORDER — NYSTATIN 100000 U/G
CREAM TOPICAL AS NEEDED
COMMUNITY
Start: 2024-06-25

## 2024-08-05 NOTE — ASSESSMENT & PLAN NOTE
Yearly physical done.  Shingrix series complete.  Boostrix 7/23  colonoscopy 6/24  Mammogram 10/23  PAP 7/22  nonsmoker

## 2024-08-26 DIAGNOSIS — N95.2 VAGINAL ATROPHY: ICD-10-CM

## 2024-08-26 RX ORDER — ESTRADIOL 10 UG/1
10 INSERT VAGINAL 2 TIMES WEEKLY
Qty: 18 TABLET | Refills: 3 | Status: SHIPPED | OUTPATIENT
Start: 2024-08-26

## 2024-08-26 NOTE — TELEPHONE ENCOUNTER
Patient of Dr. Galdamez was given estrodoil  1 mcg vaginal tablets in April and has an annual scheduled for October 14, 2024 and would like enough refills to get her to her appointment

## 2024-10-14 ENCOUNTER — APPOINTMENT (OUTPATIENT)
Dept: OBSTETRICS AND GYNECOLOGY | Facility: CLINIC | Age: 49
End: 2024-10-14
Payer: COMMERCIAL

## 2024-10-14 VITALS
HEIGHT: 70 IN | DIASTOLIC BLOOD PRESSURE: 80 MMHG | SYSTOLIC BLOOD PRESSURE: 124 MMHG | BODY MASS INDEX: 32.73 KG/M2 | WEIGHT: 228.6 LBS

## 2024-10-14 DIAGNOSIS — Z12.31 ENCOUNTER FOR SCREENING MAMMOGRAM FOR MALIGNANT NEOPLASM OF BREAST: ICD-10-CM

## 2024-10-14 DIAGNOSIS — N95.2 VAGINAL ATROPHY: ICD-10-CM

## 2024-10-14 DIAGNOSIS — Z12.4 SCREENING FOR CERVICAL CANCER: Primary | ICD-10-CM

## 2024-10-14 PROCEDURE — 3008F BODY MASS INDEX DOCD: CPT | Performed by: OBSTETRICS & GYNECOLOGY

## 2024-10-14 PROCEDURE — 1036F TOBACCO NON-USER: CPT | Performed by: OBSTETRICS & GYNECOLOGY

## 2024-10-14 PROCEDURE — 99396 PREV VISIT EST AGE 40-64: CPT | Performed by: OBSTETRICS & GYNECOLOGY

## 2024-10-14 PROCEDURE — 87624 HPV HI-RISK TYP POOLED RSLT: CPT

## 2024-10-14 RX ORDER — ESTRADIOL 10 UG/1
10 INSERT VAGINAL 2 TIMES WEEKLY
Qty: 18 TABLET | Refills: 3 | Status: SHIPPED | OUTPATIENT
Start: 2024-10-14

## 2024-10-14 ASSESSMENT — PATIENT HEALTH QUESTIONNAIRE - PHQ9
2. FEELING DOWN, DEPRESSED OR HOPELESS: NOT AT ALL
1. LITTLE INTEREST OR PLEASURE IN DOING THINGS: NOT AT ALL
SUM OF ALL RESPONSES TO PHQ9 QUESTIONS 1 & 2: 0

## 2024-10-14 NOTE — PROGRESS NOTES
Subjective   Patient ID: Riddhi Berumen is a 49 y.o. female who presents for No chief complaint on file..  HPI 49 years old with almost 1 year of amenorrhea presents for yearly exam.  She has been on Vagifem for couple of years and is doing well.  She is currently not sexually active says her  has lower erection dysfunction.    Review of Systems   All other systems reviewed and are negative.      Objective   Physical Exam  Vitals reviewed.   Constitutional:       Appearance: Normal appearance. She is obese.   HENT:      Head: Normocephalic and atraumatic.      Nose: Nose normal.   Cardiovascular:      Rate and Rhythm: Normal rate and regular rhythm.   Pulmonary:      Effort: Pulmonary effort is normal.      Breath sounds: Normal breath sounds.   Chest:      Chest wall: No mass.   Breasts:     Right: Normal.      Left: Normal.   Abdominal:      General: Abdomen is flat. Bowel sounds are normal. There is no distension.      Palpations: Abdomen is soft. There is no mass.   Genitourinary:     General: Normal vulva.      Vagina: Normal.      Cervix: Normal.      Uterus: Normal.       Adnexa: Right adnexa normal and left adnexa normal.      Rectum: Normal.   Musculoskeletal:         General: Normal range of motion.      Cervical back: Normal range of motion.   Skin:     General: Skin is warm and dry.   Neurological:      General: No focal deficit present.      Mental Status: She is alert.   Psychiatric:         Mood and Affect: Mood normal.         Behavior: Behavior normal.         Assessment/Plan   Problem List Items Addressed This Visit    None  Visit Diagnoses         Codes    Screening for cervical cancer    -  Primary Z12.4    Relevant Orders    THINPREP PAP TEST    Encounter for screening mammogram for malignant neoplasm of breast     Z12.31    Relevant Orders    BI mammo bilateral screening tomosynthesis    Vaginal atrophy     N95.2    Relevant Medications    estradiol (Vagifem) 10 mcg tablet vaginal  tablet          The refill of Vagifem was sent in.  A Pap smear was done in order her mammogram was placed.  She was encouraged to self breast exam monthly lose weight exercise regularly.  Her recent colonoscopy was positive for 1 polyp per patient  and she is scheduled to return in 3 years.       Jaswinder Galdamez MD 10/14/24 3:44 PM

## 2024-10-17 ENCOUNTER — HOSPITAL ENCOUNTER (OUTPATIENT)
Dept: RADIOLOGY | Facility: CLINIC | Age: 49
Discharge: HOME | End: 2024-10-17
Payer: COMMERCIAL

## 2024-10-17 VITALS — BODY MASS INDEX: 34.41 KG/M2 | HEIGHT: 68 IN | WEIGHT: 227.07 LBS

## 2024-10-17 DIAGNOSIS — Z12.31 ENCOUNTER FOR SCREENING MAMMOGRAM FOR MALIGNANT NEOPLASM OF BREAST: ICD-10-CM

## 2024-10-17 PROCEDURE — 77067 SCR MAMMO BI INCL CAD: CPT

## 2024-10-23 ENCOUNTER — TELEPHONE (OUTPATIENT)
Dept: OBSTETRICS AND GYNECOLOGY | Facility: CLINIC | Age: 49
End: 2024-10-23
Payer: COMMERCIAL

## 2024-10-23 DIAGNOSIS — N95.2 VAGINAL ATROPHY: ICD-10-CM

## 2024-10-23 RX ORDER — ESTRADIOL 10 UG/1
INSERT VAGINAL
Qty: 14 TABLET | Refills: 0 | Status: CANCELLED | OUTPATIENT
Start: 2024-10-23

## 2024-10-23 RX ORDER — ESTRADIOL 10 UG/1
INSERT VAGINAL
Qty: 8 TABLET | Refills: 12 | Status: CANCELLED | OUTPATIENT
Start: 2024-10-23

## 2024-10-24 LAB
CYTOLOGY CMNT CVX/VAG CYTO-IMP: NORMAL
HPV HR 12 DNA GENITAL QL NAA+PROBE: NEGATIVE
HPV HR GENOTYPES PNL CVX NAA+PROBE: NEGATIVE
HPV16 DNA SPEC QL NAA+PROBE: NEGATIVE
HPV18 DNA SPEC QL NAA+PROBE: NEGATIVE
LAB AP HPV GENOTYPE QUESTION: YES
LAB AP HPV HR: NORMAL
LABORATORY COMMENT REPORT: NORMAL
PATH REPORT.TOTAL CANCER: NORMAL

## 2024-11-26 ENCOUNTER — TELEPHONE (OUTPATIENT)
Dept: OBSTETRICS AND GYNECOLOGY | Facility: CLINIC | Age: 49
End: 2024-11-26
Payer: COMMERCIAL

## 2024-11-26 DIAGNOSIS — N95.0 PMB (POSTMENOPAUSAL BLEEDING): Primary | ICD-10-CM

## 2024-12-03 ENCOUNTER — HOSPITAL ENCOUNTER (OUTPATIENT)
Dept: RADIOLOGY | Facility: CLINIC | Age: 49
Discharge: HOME | End: 2024-12-03
Payer: COMMERCIAL

## 2024-12-03 DIAGNOSIS — N95.0 PMB (POSTMENOPAUSAL BLEEDING): ICD-10-CM

## 2024-12-03 PROCEDURE — 76856 US EXAM PELVIC COMPLETE: CPT

## 2024-12-03 PROCEDURE — 76830 TRANSVAGINAL US NON-OB: CPT | Performed by: RADIOLOGY

## 2024-12-03 PROCEDURE — 76856 US EXAM PELVIC COMPLETE: CPT | Performed by: RADIOLOGY

## 2024-12-10 ENCOUNTER — APPOINTMENT (OUTPATIENT)
Dept: OBSTETRICS AND GYNECOLOGY | Facility: CLINIC | Age: 49
End: 2024-12-10
Payer: COMMERCIAL

## 2024-12-10 VITALS
WEIGHT: 230 LBS | SYSTOLIC BLOOD PRESSURE: 118 MMHG | BODY MASS INDEX: 32.93 KG/M2 | DIASTOLIC BLOOD PRESSURE: 70 MMHG | HEIGHT: 70 IN

## 2024-12-10 DIAGNOSIS — N83.201 CYST OF RIGHT OVARY: ICD-10-CM

## 2024-12-10 DIAGNOSIS — N95.0 PMB (POSTMENOPAUSAL BLEEDING): Primary | ICD-10-CM

## 2024-12-10 PROCEDURE — 99212 OFFICE O/P EST SF 10 MIN: CPT | Performed by: OBSTETRICS & GYNECOLOGY

## 2024-12-10 PROCEDURE — 3008F BODY MASS INDEX DOCD: CPT | Performed by: OBSTETRICS & GYNECOLOGY

## 2024-12-10 NOTE — PROGRESS NOTES
Subjective   Patient ID: Riddhi Berumen is a 49 y.o. female who presents for No chief complaint on file..  HPI ultrasound follow-up.  LMP October 2023.  She had 1 episode of spotting in November.  She denies any further spotting or any pain.  Her ultrasound was reviewed today and today which shows small uterus with endometrial lining of 3 mm.  There is 1 3 cm simple cyst on the right ovary.  Patient is asymptomatic.    Review of Systems   All other systems reviewed and are negative.      Objective   Physical Exam  Constitutional:       Appearance: Normal appearance.   Pulmonary:      Effort: Pulmonary effort is normal.   Neurological:      Mental Status: She is alert.   Psychiatric:         Mood and Affect: Mood normal.         Assessment/Plan   Problem List Items Addressed This Visit    None  Visit Diagnoses         Codes    PMB (postmenopausal bleeding)    -  Primary N95.0    Cyst of right ovary     N83.201          I have reviewed the pelvic ultrasound today and I have reassured her.  I have encouraged her to continue to monitor at this time and follow-up if she has any symptoms or recurrence of vaginal bleeding.  Otherwise follow-up in 1 year.       Jaswinder Galdamez MD 12/10/24 11:29 AM

## 2025-02-17 ENCOUNTER — APPOINTMENT (OUTPATIENT)
Dept: PRIMARY CARE | Facility: CLINIC | Age: 50
End: 2025-02-17
Payer: COMMERCIAL

## 2025-05-22 DIAGNOSIS — N95.2 VAGINAL ATROPHY: ICD-10-CM

## 2025-05-22 NOTE — TELEPHONE ENCOUNTER
Automatic electronic refill request from pharmacy.  Patient last seen: 12/10/24 for PMB  Upcoming appointment scheduled: 10/20/25  Refills pended for Dr. Galdamez to review.

## 2025-05-23 RX ORDER — ESTRADIOL 10 UG/1
TABLET, FILM COATED VAGINAL
Qty: 18 TABLET | Refills: 3 | Status: SHIPPED | OUTPATIENT
Start: 2025-05-23

## 2025-05-24 LAB
25(OH)D3+25(OH)D2 SERPL-MCNC: 46 NG/ML (ref 30–100)
ALBUMIN SERPL-MCNC: 4.1 G/DL (ref 3.6–5.1)
ALP SERPL-CCNC: 95 U/L (ref 37–153)
ALT SERPL-CCNC: 20 U/L (ref 6–29)
ANION GAP SERPL CALCULATED.4IONS-SCNC: 8 MMOL/L (CALC) (ref 7–17)
AST SERPL-CCNC: 26 U/L (ref 10–35)
BILIRUB SERPL-MCNC: 0.4 MG/DL (ref 0.2–1.2)
BUN SERPL-MCNC: 12 MG/DL (ref 7–25)
CALCIUM SERPL-MCNC: 9.2 MG/DL (ref 8.6–10.4)
CHLORIDE SERPL-SCNC: 103 MMOL/L (ref 98–110)
CHOLEST SERPL-MCNC: 189 MG/DL
CHOLEST/HDLC SERPL: 2.9 (CALC)
CO2 SERPL-SCNC: 27 MMOL/L (ref 20–32)
CREAT SERPL-MCNC: 0.66 MG/DL (ref 0.5–1.03)
EGFRCR SERPLBLD CKD-EPI 2021: 105 ML/MIN/1.73M2
ERYTHROCYTE [DISTWIDTH] IN BLOOD BY AUTOMATED COUNT: 13 % (ref 11–15)
EST. AVERAGE GLUCOSE BLD GHB EST-MCNC: 128 MG/DL
EST. AVERAGE GLUCOSE BLD GHB EST-SCNC: 7.1 MMOL/L
FERRITIN SERPL-MCNC: 18 NG/ML (ref 16–232)
FOLATE SERPL-MCNC: 23.4 NG/ML
GLUCOSE SERPL-MCNC: 97 MG/DL (ref 65–99)
HBA1C MFR BLD: 6.1 %
HCT VFR BLD AUTO: 43.1 % (ref 35–45)
HDLC SERPL-MCNC: 65 MG/DL
HGB BLD-MCNC: 13.8 G/DL (ref 11.7–15.5)
IRON SATN MFR SERPL: 34 % (CALC) (ref 16–45)
IRON SERPL-MCNC: 126 MCG/DL (ref 45–160)
LDLC SERPL CALC-MCNC: 100 MG/DL (CALC)
LDLC SERPL DIRECT ASSAY-MCNC: 116 MG/DL
MAGNESIUM SERPL-MCNC: 1.9 MG/DL (ref 1.5–2.5)
MCH RBC QN AUTO: 30.3 PG (ref 27–33)
MCHC RBC AUTO-ENTMCNC: 32 G/DL (ref 32–36)
MCV RBC AUTO: 94.7 FL (ref 80–100)
NONHDLC SERPL-MCNC: 124 MG/DL (CALC)
PLATELET # BLD AUTO: 404 THOUSAND/UL (ref 140–400)
PMV BLD REES-ECKER: 9.4 FL (ref 7.5–12.5)
POTASSIUM SERPL-SCNC: 4.4 MMOL/L (ref 3.5–5.3)
PROT SERPL-MCNC: 7 G/DL (ref 6.1–8.1)
PTH-INTACT SERPL-MCNC: 56 PG/ML (ref 16–77)
PYRIDOXAL PHOS SERPL-MCNC: NORMAL UG/L
RBC # BLD AUTO: 4.55 MILLION/UL (ref 3.8–5.1)
SODIUM SERPL-SCNC: 138 MMOL/L (ref 135–146)
TIBC SERPL-MCNC: 373 MCG/DL (CALC) (ref 250–450)
TRIGL SERPL-MCNC: 139 MG/DL
TSH SERPL-ACNC: 1.06 MIU/L
VIT B12 SERPL-MCNC: 591 PG/ML (ref 200–1100)
WBC # BLD AUTO: 10.6 THOUSAND/UL (ref 3.8–10.8)
ZINC SERPL-MCNC: NORMAL UG/ML

## 2025-05-27 NOTE — ASSESSMENT & PLAN NOTE
Improved from 6.3 to 6.1  Work on diet reviewed with patient.   Increase physical activity as able   Recheck 6 months

## 2025-05-27 NOTE — PROGRESS NOTES
Subjective   Patient ID: Riddhi Berumen is a 50 y.o. female who presents for Follow-up.  HPI  Patient presents today for follow up labs and chronic conditions.  Patient feels well. No other complaints or concerns.    The patient's relevant past medical, surgical, family, and social history was reviewed in [facility].  All pertinent lab work and results for this visit were reviewed with patient.    Orders Only on 05/23/2025   Component Date Value Ref Range Status    CHOLESTEROL, TOTAL 05/23/2025 189  <200 mg/dL Final    HDL CHOLESTEROL 05/23/2025 65  > OR = 50 mg/dL Final    TRIGLYCERIDES 05/23/2025 139  <150 mg/dL Final    LDL-CHOLESTEROL 05/23/2025 100 (H)  mg/dL (calc) Final    Comment: Reference range: <100     Desirable range <100 mg/dL for primary prevention;    <70 mg/dL for patients with CHD or diabetic patients   with > or = 2 CHD risk factors.     LDL-C is now calculated using the Elias   calculation, which is a validated novel method providing   better accuracy than the Friedewald equation in the   estimation of LDL-C.   Mario VIRK et al. CHARITY. 2013;310(19): 0248-1002   (http://education.[employer]/faq/DVA649)      CHOL/HDLC RATIO 05/23/2025 2.9  <5.0 (calc) Final    NON HDL CHOLESTEROL 05/23/2025 124  <130 mg/dL (calc) Final    Comment: For patients with diabetes plus 1 major ASCVD risk   factor, treating to a non-HDL-C goal of <100 mg/dL   (LDL-C of <70 mg/dL) is considered a therapeutic   option.      DIRECT LDL 05/23/2025 116 (H)  <100 mg/dL Final    Comment:    Desirable range <100 mg/dL for primary prevention;    <70 mg/dL for patients with CHD or diabetic patients   with > or = 2 CHD risk factors.         MAGNESIUM 05/23/2025 1.9  1.5 - 2.5 mg/dL Final    IRON, TOTAL 05/23/2025 126  45 - 160 mcg/dL Final    IRON BINDING CAPACITY 05/23/2025 373  250 - 450 mcg/dL (calc) Final    % SATURATION 05/23/2025 34  16 - 45 % (calc) Final    GLUCOSE 05/23/2025 97  65 - 99 mg/dL Final    Comment:                Fasting reference interval         UREA NITROGEN (BUN) 05/23/2025 12  7 - 25 mg/dL Final    CREATININE 05/23/2025 0.66  0.50 - 1.03 mg/dL Final    EGFR 05/23/2025 105  > OR = 60 mL/min/1.73m2 Final    SODIUM 05/23/2025 138  135 - 146 mmol/L Final    POTASSIUM 05/23/2025 4.4  3.5 - 5.3 mmol/L Final    CHLORIDE 05/23/2025 103  98 - 110 mmol/L Final    CARBON DIOXIDE 05/23/2025 27  20 - 32 mmol/L Final    ELECTROLYTE BALANCE 05/23/2025 8  7 - 17 mmol/L (calc) Final    CALCIUM 05/23/2025 9.2  8.6 - 10.4 mg/dL Final    PROTEIN, TOTAL 05/23/2025 7.0  6.1 - 8.1 g/dL Final    ALBUMIN 05/23/2025 4.1  3.6 - 5.1 g/dL Final    BILIRUBIN, TOTAL 05/23/2025 0.4  0.2 - 1.2 mg/dL Final    ALKALINE PHOSPHATASE 05/23/2025 95  37 - 153 U/L Final    AST 05/23/2025 26  10 - 35 U/L Final    ALT 05/23/2025 20  6 - 29 U/L Final    VITAMIN B6, PLASMA 05/23/2025 23.8 (H)  2.1 - 21.7 ng/mL Final    Comment:    Vitamin supplementation within 24 hours prior to  blood draw may affect the accuracy of the results.     This test was developed and its analytical performance  characteristics have been determined by [facility] [place]. It has  not been cleared or approved by the U.S. Food and Drug  Administration. This assay has been validated pursuant  to the CLIA regulations and is used for clinical  purposes.         ZINC 05/23/2025 62  60 - 130 mcg/dL Final    Comment:    This test was developed and its analytical performance  characteristics have been determined by [facility]. It has  not been cleared or approved by the U.S. Food and Drug  Administration. This assay has been validated pursuant  to the CLIA regulations and is used for clinical  purposes.         WHITE BLOOD CELL COUNT 05/23/2025 10.6  3.8 - 10.8 Thousand/uL Final    RED BLOOD CELL COUNT 05/23/2025 4.55  3.80 - 5.10 Million/uL Final    HEMOGLOBIN 05/23/2025 13.8  11.7 - 15.5 g/dL Final    HEMATOCRIT  05/23/2025 43.1  35.0 - 45.0 % Final    MCV 05/23/2025 94.7  80.0 - 100.0 fL Final    MCH 05/23/2025 30.3  27.0 - 33.0 pg Final    MCHC 05/23/2025 32.0  32.0 - 36.0 g/dL Final    Comment: For adults, a slight decrease in the calculated MCHC  value (in the range of 30 to 32 g/dL) is most likely  not clinically significant; however, it should be  interpreted with caution in correlation with other  red cell parameters and the patient's clinical  condition.      RDW 05/23/2025 13.0  11.0 - 15.0 % Final    PLATELET COUNT 05/23/2025 404 (H)  140 - 400 Thousand/uL Final    MPV 05/23/2025 9.4  7.5 - 12.5 fL Final    FERRITIN 05/23/2025 18  16 - 232 ng/mL Final    FOLATE, SERUM 05/23/2025 23.4  ng/mL Final    Comment:                 Reference Range                  Low:           <3.4                  Borderline:    3.4-5.4                  Normal:        >5.4                         VITAMIN B12 05/23/2025 591  200 - 1,100 pg/mL Final    PARATHYROID HORMONE, INTACT 05/23/2025 56  16 - 77 pg/mL Final    Comment:    Interpretive Guide    Intact PTH           Calcium  ------------------    ----------           -------  Normal Parathyroid    Normal               Normal  Hypoparathyroidism    Low or Low Normal    Low  Hyperparathyroidism     Primary            Normal or High       High     Secondary          High                 Normal or Low     Tertiary           High                 High  Non-Parathyroid     Hypercalcemia      Low or Low Normal    High         TSH W/REFLEX TO FT4 05/23/2025 1.06  mIU/L Final    Comment:           Reference Range                         > or = 20 Years  0.40-4.50                              Pregnancy Ranges            First trimester    0.26-2.66            Second trimester   0.55-2.73            Third trimester    0.43-2.91      VITAMIN D,25-OH,TOTAL,IA 05/23/2025 46  30 - 100 ng/mL Final    Comment: Vitamin D Status         25-OH Vitamin D:     Deficiency:                    <20  ng/mL  Insufficiency:             20 - 29 ng/mL  Optimal:                 > or = 30 ng/mL     For 25-OH Vitamin D testing on patients on   D2-supplementation and patients for whom quantitation   of D2 and D3 fractions is required, the QuestAssureD(TM)  25-OH VIT D, (D2,D3), LC/MS/MS is recommended: order   code 46985 (patients >2yrs).     See Note 1     Note 1     For additional information, please refer to   http://education.[employer]/faq/VWV029   (This link is being provided for informational/  educational purposes only.)      HEMOGLOBIN A1c 05/23/2025 6.1 (H)  <5.7 % Final    Comment: For someone without known diabetes, a hemoglobin   A1c value between 5.7% and 6.4% is consistent with  prediabetes and should be confirmed with a   follow-up test.     For someone with known diabetes, a value <7%  indicates that their diabetes is well controlled. A1c  targets should be individualized based on duration of  diabetes, age, comorbid conditions, and other  considerations.     This assay result is consistent with an increased risk  of diabetes.     Currently, no consensus exists regarding use of  hemoglobin A1c for diagnosis of diabetes for children.         eAG (mg/dL) 05/23/2025 128  mg/dL Final    eAG (mmol/L) 05/23/2025 7.1  mmol/L Final           Review of Systems   A complete review of systems was performed and all systems were normal except what is noted in the HPI.        Objective   /85   Pulse 87   Temp 36.4 °C (97.5 °F) (Temporal)   Resp 18   Wt 109 kg (240 lb 6.4 oz)   SpO2 98%   BMI 34.89 kg/m²    Physical Exam  Constitutional:       Appearance: Normal appearance. She is obese.   HENT:      Head: Normocephalic and atraumatic.   Neck:      Vascular: No carotid bruit.   Cardiovascular:      Rate and Rhythm: Normal rate and regular rhythm.      Heart sounds: Normal heart sounds.   Pulmonary:      Effort: Pulmonary effort is normal.      Breath sounds: Normal breath sounds. No wheezing,  rhonchi or rales.   Abdominal:      General: Abdomen is flat. Bowel sounds are normal.      Palpations: Abdomen is soft.      Tenderness: There is no abdominal tenderness. There is no guarding.   Musculoskeletal:         General: Normal range of motion.      Right lower leg: No edema.      Left lower leg: No edema.   Skin:     General: Skin is dry.   Neurological:      General: No focal deficit present.      Mental Status: She is alert and oriented to person, place, and time.   Psychiatric:         Mood and Affect: Mood normal.         Behavior: Behavior normal.         Thought Content: Thought content normal.       Health Maintenance Due   Topic Date Due    HIV Screening  Never done    Hepatitis B Vaccines (1 of 3 - 19+ 3-dose series) Never done    MMR Vaccines (1 of 1 - Standard series) Never done    Pneumococcal Vaccine (1 of 1 - PCV) Never done        Assessment/Plan   Problem List Items Addressed This Visit       IFG (impaired fasting glucose) - Primary    Improved from 6.3 to 6.1  Work on diet reviewed with patient.   Increase physical activity as able   Recheck 6 months          Relevant Orders    Comprehensive Metabolic Panel    Hemoglobin A1C    Hypertriglyceridemia    Improved from 180 to 139  Work on diet reviewed with patient.   Recheck 6 months          Relevant Orders    Cholesterol, LDL Direct    Lipid Panel    Vitamin D deficiency    Well controlled. Continue current medicine and recheck in 6 months.           Relevant Orders    Vitamin D 25-Hydroxy,Total (for eval of Vitamin D levels)    Zinc deficiency    within normal limits   Recheck 6 months         Relevant Orders    Zinc, Serum or Plasma    Class 1 obesity due to excess calories with serious comorbidity and body mass index (BMI) of 33.0 to 33.9 in adult    Work on diet reviewed with patient.   Recommend at least 150 minutes of cardiovascular exercise weekly.               Patient understands and agrees with care plan.           Enma Grace  MD [place]

## 2025-05-29 LAB
25(OH)D3+25(OH)D2 SERPL-MCNC: 46 NG/ML (ref 30–100)
ALBUMIN SERPL-MCNC: 4.1 G/DL (ref 3.6–5.1)
ALP SERPL-CCNC: 95 U/L (ref 37–153)
ALT SERPL-CCNC: 20 U/L (ref 6–29)
ANION GAP SERPL CALCULATED.4IONS-SCNC: 8 MMOL/L (CALC) (ref 7–17)
AST SERPL-CCNC: 26 U/L (ref 10–35)
BILIRUB SERPL-MCNC: 0.4 MG/DL (ref 0.2–1.2)
BUN SERPL-MCNC: 12 MG/DL (ref 7–25)
CALCIUM SERPL-MCNC: 9.2 MG/DL (ref 8.6–10.4)
CHLORIDE SERPL-SCNC: 103 MMOL/L (ref 98–110)
CHOLEST SERPL-MCNC: 189 MG/DL
CHOLEST/HDLC SERPL: 2.9 (CALC)
CO2 SERPL-SCNC: 27 MMOL/L (ref 20–32)
CREAT SERPL-MCNC: 0.66 MG/DL (ref 0.5–1.03)
EGFRCR SERPLBLD CKD-EPI 2021: 105 ML/MIN/1.73M2
ERYTHROCYTE [DISTWIDTH] IN BLOOD BY AUTOMATED COUNT: 13 % (ref 11–15)
EST. AVERAGE GLUCOSE BLD GHB EST-MCNC: 128 MG/DL
EST. AVERAGE GLUCOSE BLD GHB EST-SCNC: 7.1 MMOL/L
FERRITIN SERPL-MCNC: 18 NG/ML (ref 16–232)
FOLATE SERPL-MCNC: 23.4 NG/ML
GLUCOSE SERPL-MCNC: 97 MG/DL (ref 65–99)
HBA1C MFR BLD: 6.1 %
HCT VFR BLD AUTO: 43.1 % (ref 35–45)
HDLC SERPL-MCNC: 65 MG/DL
HGB BLD-MCNC: 13.8 G/DL (ref 11.7–15.5)
IRON SATN MFR SERPL: 34 % (CALC) (ref 16–45)
IRON SERPL-MCNC: 126 MCG/DL (ref 45–160)
LDLC SERPL CALC-MCNC: 100 MG/DL (CALC)
LDLC SERPL DIRECT ASSAY-MCNC: 116 MG/DL
MAGNESIUM SERPL-MCNC: 1.9 MG/DL (ref 1.5–2.5)
MCH RBC QN AUTO: 30.3 PG (ref 27–33)
MCHC RBC AUTO-ENTMCNC: 32 G/DL (ref 32–36)
MCV RBC AUTO: 94.7 FL (ref 80–100)
NONHDLC SERPL-MCNC: 124 MG/DL (CALC)
PLATELET # BLD AUTO: 404 THOUSAND/UL (ref 140–400)
PMV BLD REES-ECKER: 9.4 FL (ref 7.5–12.5)
POTASSIUM SERPL-SCNC: 4.4 MMOL/L (ref 3.5–5.3)
PROT SERPL-MCNC: 7 G/DL (ref 6.1–8.1)
PTH-INTACT SERPL-MCNC: 56 PG/ML (ref 16–77)
PYRIDOXAL PHOS SERPL-MCNC: 23.8 NG/ML (ref 2.1–21.7)
RBC # BLD AUTO: 4.55 MILLION/UL (ref 3.8–5.1)
SODIUM SERPL-SCNC: 138 MMOL/L (ref 135–146)
TIBC SERPL-MCNC: 373 MCG/DL (CALC) (ref 250–450)
TRIGL SERPL-MCNC: 139 MG/DL
TSH SERPL-ACNC: 1.06 MIU/L
VIT B12 SERPL-MCNC: 591 PG/ML (ref 200–1100)
WBC # BLD AUTO: 10.6 THOUSAND/UL (ref 3.8–10.8)
ZINC SERPL-MCNC: 62 MCG/DL (ref 60–130)

## 2025-05-30 ENCOUNTER — APPOINTMENT (OUTPATIENT)
Dept: PRIMARY CARE | Facility: CLINIC | Age: 50
End: 2025-05-30
Payer: COMMERCIAL

## 2025-05-30 VITALS
TEMPERATURE: 97.5 F | WEIGHT: 240.4 LBS | OXYGEN SATURATION: 98 % | BODY MASS INDEX: 34.89 KG/M2 | HEART RATE: 87 BPM | SYSTOLIC BLOOD PRESSURE: 130 MMHG | RESPIRATION RATE: 18 BRPM | DIASTOLIC BLOOD PRESSURE: 85 MMHG

## 2025-05-30 DIAGNOSIS — E66.811 CLASS 1 OBESITY DUE TO EXCESS CALORIES WITH SERIOUS COMORBIDITY AND BODY MASS INDEX (BMI) OF 33.0 TO 33.9 IN ADULT: ICD-10-CM

## 2025-05-30 DIAGNOSIS — E66.09 CLASS 1 OBESITY DUE TO EXCESS CALORIES WITH SERIOUS COMORBIDITY AND BODY MASS INDEX (BMI) OF 33.0 TO 33.9 IN ADULT: ICD-10-CM

## 2025-05-30 DIAGNOSIS — E60 ZINC DEFICIENCY: ICD-10-CM

## 2025-05-30 DIAGNOSIS — E78.1 HYPERTRIGLYCERIDEMIA: ICD-10-CM

## 2025-05-30 DIAGNOSIS — E55.9 VITAMIN D DEFICIENCY: ICD-10-CM

## 2025-05-30 DIAGNOSIS — R73.01 IFG (IMPAIRED FASTING GLUCOSE): Primary | ICD-10-CM

## 2025-05-30 PROCEDURE — 90471 IMMUNIZATION ADMIN: CPT | Performed by: FAMILY MEDICINE

## 2025-05-30 PROCEDURE — 90677 PCV20 VACCINE IM: CPT | Performed by: FAMILY MEDICINE

## 2025-05-30 PROCEDURE — 99214 OFFICE O/P EST MOD 30 MIN: CPT | Performed by: FAMILY MEDICINE

## 2025-05-30 PROCEDURE — 1036F TOBACCO NON-USER: CPT | Performed by: FAMILY MEDICINE

## 2025-05-30 ASSESSMENT — PATIENT HEALTH QUESTIONNAIRE - PHQ9
2. FEELING DOWN, DEPRESSED OR HOPELESS: NOT AT ALL
SUM OF ALL RESPONSES TO PHQ9 QUESTIONS 1 AND 2: 0
1. LITTLE INTEREST OR PLEASURE IN DOING THINGS: NOT AT ALL

## 2025-06-09 ENCOUNTER — TELEPHONE (OUTPATIENT)
Dept: OBSTETRICS AND GYNECOLOGY | Facility: CLINIC | Age: 50
End: 2025-06-09
Payer: COMMERCIAL

## 2025-06-09 DIAGNOSIS — N92.6 IRREGULAR BLEEDING: Primary | ICD-10-CM

## 2025-06-09 RX ORDER — NORETHINDRONE 5 MG/1
5 TABLET ORAL DAILY
Qty: 30 TABLET | Refills: 11 | Status: SHIPPED | OUTPATIENT
Start: 2025-06-09 | End: 2026-06-09

## 2025-06-09 NOTE — TELEPHONE ENCOUNTER
Patient states she was told to call into office if she had more bleeding, she states she had a bad period that started Thursday, please advise

## 2025-07-16 ENCOUNTER — TELEPHONE (OUTPATIENT)
Dept: OBSTETRICS AND GYNECOLOGY | Facility: CLINIC | Age: 50
End: 2025-07-16
Payer: COMMERCIAL

## 2025-07-16 NOTE — TELEPHONE ENCOUNTER
Returned patient's call, she was taking norethindrone 5 mg to help with bleeding as she transitioned into menopause but had to stop as she was having generalized itching that she could no longer tolerate.  She has since started bleeding again that is consistent with a normal period flow.  What recommendation do you have for her?

## 2025-07-16 NOTE — TELEPHONE ENCOUNTER
Patient states she has skin irration, from taking norethindrone 5MG, so she stopped taking the medication, would like to know what she should do now?

## 2025-07-17 NOTE — TELEPHONE ENCOUNTER
Discussed with patient that Dr Galdamez said Vagifem topical no need for Progesterone as it is not systemic and absorption to uterus is minimal. Patient verbalizes understanding.

## 2025-07-17 NOTE — TELEPHONE ENCOUNTER
Discussed with patient. States she only gets period every 6-7 months, but will track this and follow up. Will be due for annual after 10/14/25. She is using vagifem twice a week. Is it ok for her to continue to use the unopposed estrogen in the meantime?

## 2025-07-25 ENCOUNTER — TELEPHONE (OUTPATIENT)
Dept: PRIMARY CARE | Facility: CLINIC | Age: 50
End: 2025-07-25
Payer: COMMERCIAL

## 2025-07-25 NOTE — TELEPHONE ENCOUNTER
The patient called today to state they have a wellness program through her husbands insurance that requires them to have an annual prior to the end of September.     Patient was in for a visit on 5/30/2025 with Dr BERUMEN.     She asked if this appointment could serve as an annual.     If not, she would like to get in to see Dr BERUMEN.

## 2025-08-15 DIAGNOSIS — R73.01 IFG (IMPAIRED FASTING GLUCOSE): ICD-10-CM

## 2025-08-15 DIAGNOSIS — E78.1 HYPERTRIGLYCERIDEMIA: ICD-10-CM

## 2025-08-15 DIAGNOSIS — E55.9 VITAMIN D DEFICIENCY: ICD-10-CM

## 2025-08-15 DIAGNOSIS — E60 ZINC DEFICIENCY: ICD-10-CM

## 2025-08-18 LAB
25(OH)D3+25(OH)D2 SERPL-MCNC: 46 NG/ML (ref 30–100)
ALBUMIN SERPL-MCNC: 4.2 G/DL (ref 3.6–5.1)
ALP SERPL-CCNC: 88 U/L (ref 37–153)
ALT SERPL-CCNC: 23 U/L (ref 6–29)
ANION GAP SERPL CALCULATED.4IONS-SCNC: 10 MMOL/L (CALC) (ref 7–17)
AST SERPL-CCNC: 25 U/L (ref 10–35)
BILIRUB SERPL-MCNC: 0.4 MG/DL (ref 0.2–1.2)
BUN SERPL-MCNC: 15 MG/DL (ref 7–25)
CALCIUM SERPL-MCNC: 9.3 MG/DL (ref 8.6–10.4)
CHLORIDE SERPL-SCNC: 102 MMOL/L (ref 98–110)
CHOLEST SERPL-MCNC: 210 MG/DL
CHOLEST/HDLC SERPL: 3.5 (CALC)
CO2 SERPL-SCNC: 26 MMOL/L (ref 20–32)
CREAT SERPL-MCNC: 0.71 MG/DL (ref 0.5–1.03)
EGFRCR SERPLBLD CKD-EPI 2021: 102 ML/MIN/1.73M2
EST. AVERAGE GLUCOSE BLD GHB EST-MCNC: 131 MG/DL
EST. AVERAGE GLUCOSE BLD GHB EST-SCNC: 7.3 MMOL/L
GLUCOSE SERPL-MCNC: 103 MG/DL (ref 65–99)
HBA1C MFR BLD: 6.2 %
HDLC SERPL-MCNC: 60 MG/DL
LDLC SERPL CALC-MCNC: 122 MG/DL (CALC)
LDLC SERPL DIRECT ASSAY-MCNC: 137 MG/DL
NONHDLC SERPL-MCNC: 150 MG/DL (CALC)
POTASSIUM SERPL-SCNC: 4 MMOL/L (ref 3.5–5.3)
PROT SERPL-MCNC: 7.3 G/DL (ref 6.1–8.1)
SODIUM SERPL-SCNC: 138 MMOL/L (ref 135–146)
TRIGL SERPL-MCNC: 168 MG/DL
ZINC SERPL-MCNC: 65 MCG/DL (ref 60–130)

## 2025-08-22 ENCOUNTER — APPOINTMENT (OUTPATIENT)
Dept: PRIMARY CARE | Facility: CLINIC | Age: 50
End: 2025-08-22
Payer: COMMERCIAL

## 2025-08-25 ENCOUNTER — APPOINTMENT (OUTPATIENT)
Dept: PRIMARY CARE | Facility: CLINIC | Age: 50
End: 2025-08-25
Payer: COMMERCIAL

## 2025-08-25 VITALS
SYSTOLIC BLOOD PRESSURE: 123 MMHG | BODY MASS INDEX: 34.95 KG/M2 | HEART RATE: 89 BPM | DIASTOLIC BLOOD PRESSURE: 81 MMHG | WEIGHT: 240.8 LBS | TEMPERATURE: 96.8 F | OXYGEN SATURATION: 96 %

## 2025-08-25 DIAGNOSIS — E60 ZINC DEFICIENCY: ICD-10-CM

## 2025-08-25 DIAGNOSIS — E55.9 VITAMIN D DEFICIENCY: ICD-10-CM

## 2025-08-25 DIAGNOSIS — R73.01 IFG (IMPAIRED FASTING GLUCOSE): ICD-10-CM

## 2025-08-25 DIAGNOSIS — Z00.00 ANNUAL PHYSICAL EXAM: Primary | ICD-10-CM

## 2025-08-25 DIAGNOSIS — E66.09 CLASS 1 OBESITY DUE TO EXCESS CALORIES WITH SERIOUS COMORBIDITY AND BODY MASS INDEX (BMI) OF 33.0 TO 33.9 IN ADULT: ICD-10-CM

## 2025-08-25 DIAGNOSIS — E78.1 HYPERTRIGLYCERIDEMIA: ICD-10-CM

## 2025-08-25 DIAGNOSIS — E66.811 CLASS 1 OBESITY DUE TO EXCESS CALORIES WITH SERIOUS COMORBIDITY AND BODY MASS INDEX (BMI) OF 33.0 TO 33.9 IN ADULT: ICD-10-CM

## 2025-08-25 PROCEDURE — 99396 PREV VISIT EST AGE 40-64: CPT | Performed by: FAMILY MEDICINE

## 2025-08-25 PROCEDURE — 1036F TOBACCO NON-USER: CPT | Performed by: FAMILY MEDICINE

## 2025-08-25 PROCEDURE — 99214 OFFICE O/P EST MOD 30 MIN: CPT | Performed by: FAMILY MEDICINE

## 2025-08-25 RX ORDER — GLUCOSAM/CHONDRO/HERB 149/HYAL 750-100 MG
TABLET ORAL
COMMUNITY

## 2025-09-04 ENCOUNTER — TELEPHONE (OUTPATIENT)
Dept: PRIMARY CARE | Facility: CLINIC | Age: 50
End: 2025-09-04
Payer: COMMERCIAL

## 2025-09-04 DIAGNOSIS — K21.9 GASTROESOPHAGEAL REFLUX DISEASE WITHOUT ESOPHAGITIS: ICD-10-CM

## 2025-09-04 RX ORDER — OMEPRAZOLE 20 MG/1
20 CAPSULE, DELAYED RELEASE ORAL DAILY
Qty: 30 CAPSULE | Refills: 5 | Status: SHIPPED | OUTPATIENT
Start: 2025-09-04

## 2025-10-20 ENCOUNTER — APPOINTMENT (OUTPATIENT)
Dept: OBSTETRICS AND GYNECOLOGY | Facility: CLINIC | Age: 50
End: 2025-10-20
Payer: COMMERCIAL

## 2025-10-27 ENCOUNTER — APPOINTMENT (OUTPATIENT)
Dept: OBSTETRICS AND GYNECOLOGY | Facility: CLINIC | Age: 50
End: 2025-10-27
Payer: COMMERCIAL

## 2025-12-05 ENCOUNTER — APPOINTMENT (OUTPATIENT)
Dept: PRIMARY CARE | Facility: CLINIC | Age: 50
End: 2025-12-05
Payer: COMMERCIAL

## 2026-02-23 ENCOUNTER — APPOINTMENT (OUTPATIENT)
Dept: PRIMARY CARE | Facility: CLINIC | Age: 51
End: 2026-02-23
Payer: COMMERCIAL